# Patient Record
Sex: FEMALE | Race: WHITE | Employment: OTHER | URBAN - METROPOLITAN AREA
[De-identification: names, ages, dates, MRNs, and addresses within clinical notes are randomized per-mention and may not be internally consistent; named-entity substitution may affect disease eponyms.]

---

## 2017-01-05 ENCOUNTER — OFFICE VISIT (OUTPATIENT)
Dept: ORTHOPEDICS | Facility: CLINIC | Age: 52
End: 2017-01-05

## 2017-01-05 DIAGNOSIS — Z96.651 STATUS POST TOTAL RIGHT KNEE REPLACEMENT: Primary | ICD-10-CM

## 2017-01-05 RX ORDER — HYDROCODONE BITARTRATE AND ACETAMINOPHEN 5; 325 MG/1; MG/1
1 TABLET ORAL EVERY 6 HOURS PRN
Qty: 30 TABLET | Refills: 0 | Status: SHIPPED | OUTPATIENT
Start: 2017-01-05 | End: 2017-03-09

## 2017-01-05 NOTE — PROGRESS NOTES
HISTORY OF PRESENT ILLNESS:  Nicole is seen 6 weeks post of her right tibia nonunion ORIF, status post TKA.  She is doing well, is in a hinged brace.  The wound is healed and it is benign with no acute evidence of infection or drainage.  She is nontender, the brace and is not rubbing anymore and she feels that she is progressing well, has it off while she is sitting.  She is working on gentle range of motion with isometrics to her quads, taking occasional Vicodin at night for pain.  No acute neurocirculatory deficits are noted.  X-rays were taken, which show no significant change in healing of the nonunion ORIF, date of surgery 11/17/2016.      PLAN:  At this time, we will start a bone stimulator.  We will call her and get this set up.  We again advised her to stop smoking cigarettes.  We gave her more Vicodin for pain.  She will stay in the brace and follow up in 4-6 weeks for an AP and lateral x-ray of her right knee on arrival or sooner should she have increasing pain, problems or other complications.

## 2017-01-05 NOTE — Clinical Note
1/5/2017       RE: Nicole Rosales  [address] [facility] [address]     Dear Colleague,    Thank you for referring your patient, Nicole Rosales, to the [facility] at [facility]. Please see a copy of my visit note below.    HISTORY OF PRESENT ILLNESS:  Nicole is seen 6 weeks post of her right tibia nonunion ORIF, status post TKA.  She is doing well, is in a hinged brace.  The wound is healed and it is benign with no acute evidence of infection or drainage.  She is nontender, the brace and is not rubbing anymore and she feels that she is progressing well, has it off while she is sitting.  She is working on gentle range of motion with isometrics to her quads, taking occasional Vicodin at night for pain.  No acute neurocirculatory deficits are noted.  X-rays were taken, which show no significant change in healing of the nonunion ORIF, date of surgery 11/17/2016.      PLAN:  At this time, we will start a bone stimulator.  We will call her and get this set up.  We again advised her to stop smoking cigarettes.  We gave her more Vicodin for pain.  She will stay in the brace and follow up in 4-6 weeks for an AP and lateral x-ray of her right knee on arrival or sooner should she have increasing pain, problems or other complications.           Again, thank you for allowing me to participate in the care of your patient.      Sincerely,    Ozzie Angulo MD

## 2017-01-30 DIAGNOSIS — Z96.659 PAINFUL TOTAL KNEE REPLACEMENT, SUBSEQUENT ENCOUNTER: Primary | ICD-10-CM

## 2017-01-30 DIAGNOSIS — T84.84XD PAINFUL TOTAL KNEE REPLACEMENT, SUBSEQUENT ENCOUNTER: Primary | ICD-10-CM

## 2017-01-30 RX ORDER — HYDROCODONE BITARTRATE AND ACETAMINOPHEN 5; 325 MG/1; MG/1
1 TABLET ORAL DAILY PRN
Qty: 10 TABLET | Refills: 0 | Status: SHIPPED | OUTPATIENT
Start: 2017-01-30 | End: 2017-03-09

## 2017-01-30 NOTE — TELEPHONE ENCOUNTER
Nicole was seen 1/5/17 and is s/p Open reduction/internal fixation with repair of right tibial nonunion on 11/17/16.  Nicole is calling for [place] refill, stating she cannot take Tramadol, and her knee has been bothering her, the screw seems to be more prominent.  She has an appt in two weeks, bone stim has been ordered, Nicole states she's down to about 5 cigarettes daily.  Rx renewed for #10 [place], no more will be refilled.  Rx mailed to pt's home per pt request.

## 2017-02-09 DIAGNOSIS — Z96.651 STATUS POST TOTAL RIGHT KNEE REPLACEMENT: Primary | ICD-10-CM

## 2017-02-15 ASSESSMENT — ENCOUNTER SYMPTOMS
PANIC: 0
POLYDIPSIA: 1
DECREASED CONCENTRATION: 0
MUSCLE CRAMPS: 1
WEIGHT LOSS: 1
SNORES LOUDLY: 1
DEPRESSION: 1
SORE THROAT: 1
COUGH: 1
HEMOPTYSIS: 0
ARTHRALGIAS: 1
TROUBLE SWALLOWING: 0
SINUS PAIN: 1
INSOMNIA: 1
TASTE DISTURBANCE: 0
HALLUCINATIONS: 0
DYSPNEA ON EXERTION: 1
DECREASED APPETITE: 1
WHEEZING: 1
SPUTUM PRODUCTION: 1
ALTERED TEMPERATURE REGULATION: 1
NIGHT SWEATS: 1
FATIGUE: 0
INCREASED ENERGY: 0
FEVER: 0
HOARSE VOICE: 1
WEIGHT GAIN: 0
MUSCLE WEAKNESS: 1
COUGH DISTURBING SLEEP: 1
NERVOUS/ANXIOUS: 0
NECK MASS: 0
STIFFNESS: 1
MYALGIAS: 1
CHILLS: 0
NECK PAIN: 0
JOINT SWELLING: 1
POLYPHAGIA: 0
RESPIRATORY PAIN: 1
BACK PAIN: 1
POSTURAL DYSPNEA: 1
SMELL DISTURBANCE: 0
SHORTNESS OF BREATH: 1
SINUS CONGESTION: 1

## 2017-02-16 ENCOUNTER — OFFICE VISIT (OUTPATIENT)
Dept: ORTHOPEDICS | Facility: CLINIC | Age: 52
End: 2017-02-16

## 2017-02-16 DIAGNOSIS — Z96.651 STATUS POST TOTAL RIGHT KNEE REPLACEMENT: Primary | ICD-10-CM

## 2017-02-16 RX ORDER — HYDROCODONE BITARTRATE AND ACETAMINOPHEN 5; 325 MG/1; MG/1
1 TABLET ORAL EVERY 8 HOURS PRN
Qty: 20 TABLET | Refills: 0 | Status: SHIPPED | OUTPATIENT
Start: 2017-02-16 | End: 2017-04-06

## 2017-02-16 NOTE — LETTER
"2/16/2017       RE: Nicole Rosales  [address] [facility] [address]     Dear Colleague,    Thank you for referring your patient, Nicole Rosales, to the [facility]THOP[facility]IC at [facility]. Please see a copy of my visit note below.    HISTORY OF PRESENT ILLNESS:  Nicole is seen on postop of her right tibia nonunion ORIF, status post TKA.  She is doing well with a hinged brace.  She reports it does not fit her appropriately and continues to rub on the anterior part of her skin.  She feels that the screws are being quite irritated because of this.  She has just started to use a bone stimulator.  She does report that she is continuing to smoke.  She states she takes Vicodin \"occasionally at night.\"       PHYSICAL EXAMINATION:  Incisions are seen clean, healed, benign and intact.  There are some prominent screw ends there, but the skin itself is intact with no evidence of further dislodgement or change in skin position or tenting.  No acute neurocirculatory deficits are noted.      IMAGING:  New x-rays were taken which show no significant change in healing of the nonunion ORIF.       DATE OF SURGERY:  11/17/2016      PLAN:  At this time, we told her she can discontinue the brace.  She will continue the bone stimulator  2 times per day.  We gave her one more prescription for Vicodin at night.  This will be her last narcotic prescription that we will give.  She will continue isometrics.  If she has any change in her skin sensation or tenting through the skin, she will call us ASAP for urgent screw removal.  Otherwise, we will see her in 2 months for an AP and lateral x-ray of her right knee on arrival.      Dictated by LIZ Strong     Sincerely,    Ozzie Angulo MD      "

## 2017-02-16 NOTE — PROGRESS NOTES
"HISTORY OF PRESENT ILLNESS:  Nicole is seen on postop of her right tibia nonunion ORIF, status post TKA.  She is doing well with a hinged brace.  She reports it does not fit her appropriately and continues to rub on the anterior part of her skin.  She feels that the screws are being quite irritated because of this.  She has just started to use a bone stimulator.  She does report that she is continuing to smoke.  She states she takes Vicodin \"occasionally at night.\"       PHYSICAL EXAMINATION:  Incisions are seen clean, healed, benign and intact.  There are some prominent screw ends there, but the skin itself is intact with no evidence of further dislodgement or change in skin position or tenting.  No acute neurocirculatory deficits are noted.      IMAGING:  New x-rays were taken which show no significant change in healing of the nonunion ORIF.       DATE OF SURGERY:  11/17/2016      PLAN:  At this time, we told her she can discontinue the brace.  She will continue the bone stimulator  2 times per day.  We gave her one more prescription for Vicodin at night.  This will be her last narcotic prescription that we will give.  She will continue isometrics.  If she has any change in her skin sensation or tenting through the skin, she will call us ASAP for urgent screw removal.  Otherwise, we will see her in 2 months for an AP and lateral x-ray of her right knee on arrival.      Dictated by LIZ Strong       "

## 2017-02-16 NOTE — MR AVS SNAPSHOT
After Visit Summary   2/16/2017    Nicole Rosales    MRN: [id number]           Patient Information     Date Of Birth          1965        Visit Information        Provider Department      2/16/2017 3:00 PM Ozzie Angulo MD [facility]        Today's Diagnoses     Status post total right knee replacement    -  1       Follow-ups after your visit        Your next 10 appointments already scheduled     Apr 20, 2017  1:00 PM CDT   (Arrive by 12:45 PM)   Return Visit with Ozzie Angulo MD   [facility] ([facility])    [address] [place] [id number] [phone]              Who to contact     Please call your clinic at [phone] to:    Ask questions about your health    Make or cancel appointments    Discuss your medicines    Learn about your test results    Speak to your doctor   If you have compliments or concerns about an experience at your clinic, or if you wish to file a complaint, please contact [facility] Patient Relations at [phone] or email us at [email or web address]         Additional Information About Your Visit        MyChart Information     [employer] gives you secure access to your electronic health record. If you see a primary care provider, you can also send messages to your care team and make appointments. If you have questions, please call your primary care clinic.  If you do not have a primary care provider, please call [phone] and they will assist you.      [employer] is an electronic gateway that provides easy, online access to your medical records. With [employer], you can request a clinic appointment, read your test results, renew a prescription or communicate with your care team.     To access your existing account, please contact your [facility] or call [phone] for assistance.        Care EveryWhere ID      This is your Care EveryWhere ID. This could be used by other organizations to access your [place] medical records  [id number]         Blood Pressure from Last 3 Encounters:   No data found for BP    Weight from Last 3 Encounters:   No data found for Wt              Today, you had the following     No orders found for display         Today's Medication Changes          These changes are accurate as of: 2/16/17 11:59 PM.  If you have any questions, ask your nurse or doctor.               These medicines have changed or have updated prescriptions.        Dose/Directions    * HYDROcodone-acetaminophen 5-325 MG per tablet   Commonly known as:  NORCO   This may have changed:  Another medication with the same name was added. Make sure you understand how and when to take each.   Used for:  Status post total right knee replacement   Changed by:  Ozzie Angulo MD        Dose:  1 tablet   Take 1 tablet by mouth every 6 hours as needed for moderate to severe pain   Quantity:  30 tablet   Refills:  0       * HYDROcodone-acetaminophen 5-325 MG per tablet   Commonly known as:  NORCO   This may have changed:  Another medication with the same name was added. Make sure you understand how and when to take each.   Used for:  Painful total knee replacement, subsequent encounter   Changed by:  Aliya Cordoba, APRN CNP        Dose:  1 tablet   Take 1 tablet by mouth daily as needed for moderate to severe pain   Quantity:  10 tablet   Refills:  0       * HYDROcodone-acetaminophen 5-325 MG per tablet   Commonly known as:  NORCO   This may have changed:  You were already taking a medication with the same name, and this prescription was added. Make sure you understand how and when to take each.   Used for:  Status post total right knee replacement   Changed by:  Ozzie Angulo MD        Dose:  1 tablet   Take 1 tablet by mouth every 8 hours as needed for moderate to severe pain   Quantity:  20 tablet   Refills:  0       *  Notice:  This list has 3 medication(s) that are the same as other medications prescribed for you. Read the directions carefully, and ask your doctor or other care provider to review them with you.         Where to get your medicines      Some of these will need a paper prescription and others can be bought over the counter.  Ask your nurse if you have questions.     Bring a paper prescription for each of these medications     HYDROcodone-acetaminophen 5-325 MG per tablet                Primary Care Provider Office Phone # Fax #    Pj Damon [phone]       [place] [address] [facility] [address]        Thank you!     Thank you for choosing [facility]  for your care. Our goal is always to provide you with excellent care. Hearing back from our patients is one way we can continue to improve our services. Please take a few minutes to complete the written survey that you may receive in the mail after your visit with us. Thank you!             Your Updated Medication List - Protect others around you: Learn how to safely use, store and throw away your medicines at www.disposemymeds.org.          This list is accurate as of: 2/16/17 11:59 PM.  Always use your most recent med list.                   Brand Name Dispense Instructions for use    acetaminophen 650 MG 8 hour tablet     100 tablet    Take 650 mg by mouth every 4 hours as needed for other (surgical pain)       ADVAIR DISKUS IN      Inhale 1 puff into the lungs two times daily       albuterol 108 (90 BASE) MCG/ACT Inhaler    PROAIR HFA/PROVENTIL HFA/VENTOLIN HFA     Inhale 2 puffs into the lungs every 6 hours as needed       cephALEXin 500 MG capsule    KEFLEX    14 capsule    Take 1 capsule (500 mg) by mouth 2 times daily       EPINEPHrine 0.3 MG/0.3ML injection      Inject 0.3 mg into the muscle once as needed for anaphylaxis       * HYDROcodone-acetaminophen 5-325 MG per tablet    NORCO    30 tablet     Take 1 tablet by mouth every 6 hours as needed for moderate to severe pain       * HYDROcodone-acetaminophen 5-325 MG per tablet    NORCO    10 tablet    Take 1 tablet by mouth daily as needed for moderate to severe pain       * HYDROcodone-acetaminophen 5-325 MG per tablet    NORCO    20 tablet    Take 1 tablet by mouth every 8 hours as needed for moderate to severe pain       nicotine 14 MG/24HR 24 hr patch    NICODERM CQ    30 patch    Place 1 patch onto the skin every 24 hours       NORVASC PO      Take 5 mg by mouth daily       oxyCODONE 5 MG IR tablet    ROXICODONE    30 tablet    Take 1-2 tablets (5-10 mg) by mouth every 3 hours as needed for pain or other (Moderate to Severe)       potassium chloride 20 MEQ Packet    KLOR-CON     Take 20 mEq by mouth 2 times daily       PROTONIX PO      Take 40 mg by mouth every morning (before breakfast)       REQUIP PO      Take 0.5 mg by mouth daily       tiotropium 18 MCG capsule    SPIRIVA     Inhale 18 mcg into the lungs daily       TRAZODONE HCL PO      Take 50 mg by mouth At Bedtime       * Notice:  This list has 3 medication(s) that are the same as other medications prescribed for you. Read the directions carefully, and ask your doctor or other care provider to review them with you.

## 2017-03-09 ENCOUNTER — OFFICE VISIT (OUTPATIENT)
Dept: ORTHOPEDICS | Facility: CLINIC | Age: 52
End: 2017-03-09

## 2017-03-09 DIAGNOSIS — Z96.659 PAINFUL TOTAL KNEE REPLACEMENT, INITIAL ENCOUNTER (H): Primary | ICD-10-CM

## 2017-03-09 DIAGNOSIS — G89.29 CHRONIC PAIN OF RIGHT KNEE: Primary | ICD-10-CM

## 2017-03-09 DIAGNOSIS — T84.84XA PAINFUL TOTAL KNEE REPLACEMENT, INITIAL ENCOUNTER (H): Primary | ICD-10-CM

## 2017-03-09 DIAGNOSIS — M25.561 CHRONIC PAIN OF RIGHT KNEE: Primary | ICD-10-CM

## 2017-03-09 NOTE — PROGRESS NOTES
HISTORY OF PRESENT ILLNESS:  Nicole is seen now 4 months post of her right tibia nonunion ORIF tibial tubercle, status post TKA, status post infection.  She called this morning rating her pain a 10/10 and is requesting something for pain.  She denies injuries, falls, twists or trauma, has been doing the bone stimulator now for almost 1 month twice a day.  She has been not wearing the brace and her skin actually is markedly improved compared to her last visit.      PAST MEDICAL HISTORY:  Reviewed.      REVIEW OF SYSTEMS:  Reviewed.      PHYSICAL EXAMINATION:  This is a pleasant, cooperative female.  Incisions are seen clean, healed, benign and intact.  Screw heads are prominent upon palpation but have remained unchanged compared to last exam.  Skin is intact with no dislodgement or change in significant position or tenting.  No acute neurocirculatory deficits.  Knee exam is within normal limits compared to previous exam.  Extension mechanism is intact.      New x-rays were taken which show no significant change in healing of the nonunion site ORIF with no change in position of fixation or hardware.  Date of surgery 11/17/2016.      PLAN:  At this time, we did encourage her to continue on the bone stimulator and use ice or heat.  At this time, patient did report that she does not want narcotics; in fact the last prescription that I gave her she still had and did not have this filled.  At this time, we prescribed salsalate/Excedrin so she can take 1-2 tabs b.i.d. for pain.  She will follow up in 1 month for an AP and lateral x-ray of her right knee on arrival.

## 2017-03-09 NOTE — LETTER
3/9/2017       RE: Nicole Rosales  [address] [facility] [address]     Dear Colleague,    Thank you for referring your patient, Nicole Rosales, to the [facility]THOPAED[facility]INIC at [facility]. Please see a copy of my visit note below.    HISTORY OF PRESENT ILLNESS:  Nicole is seen now 4 months post of her right tibia nonunion ORIF tibial tubercle, status post TKA, status post infection.  She called this morning rating her pain a 10/10 and is requesting something for pain.  She denies injuries, falls, twists or trauma, has been doing the bone stimulator now for almost 1 month twice a day.  She has been not wearing the brace and her skin actually is markedly improved compared to her last visit.      PAST MEDICAL HISTORY:  Reviewed.      REVIEW OF SYSTEMS:  Reviewed.      PHYSICAL EXAMINATION:  This is a pleasant, cooperative female.  Incisions are seen clean, healed, benign and intact.  Screw heads are prominent upon palpation but have remained unchanged compared to last exam.  Skin is intact with no dislodgement or change in significant position or tenting.  No acute neurocirculatory deficits.  Knee exam is within normal limits compared to previous exam.  Extension mechanism is intact.      New x-rays were taken which show no significant change in healing of the nonunion site ORIF with no change in position of fixation or hardware.  Date of surgery 11/17/2016.      PLAN:  At this time, we did encourage her to continue on the bone stimulator and use ice or heat.  At this time, patient did report that she does not want narcotics; in fact the last prescription that I gave her she still had and did not have this filled.  At this time, we prescribed salsalate/Excedrin so she can take 1-2 tabs b.i.d. for pain.  She will follow up in 1 month for an AP and lateral x-ray of her right knee on arrival.     Sincerely,    Ozzie Angulo MD

## 2017-03-14 ENCOUNTER — TELEPHONE (OUTPATIENT)
Dept: ORTHOPEDICS | Facility: CLINIC | Age: 52
End: 2017-03-14

## 2017-03-14 DIAGNOSIS — M25.561 CHRONIC PAIN OF RIGHT KNEE: Primary | ICD-10-CM

## 2017-03-14 DIAGNOSIS — G89.29 CHRONIC PAIN OF RIGHT KNEE: Primary | ICD-10-CM

## 2017-03-14 NOTE — TELEPHONE ENCOUNTER
Nicole called to say her pharmacy did not have access to the combo medication for her knee pain.  Aliya Cordoba NP was consulted and she ordered Salsylate Mag, and the order was escribed to pt's pharm.

## 2017-03-23 ASSESSMENT — ENCOUNTER SYMPTOMS
MUSCLE CRAMPS: 1
COUGH DISTURBING SLEEP: 1
SHORTNESS OF BREATH: 1
BACK PAIN: 1
MUSCLE WEAKNESS: 1
DYSPNEA ON EXERTION: 0
STIFFNESS: 1
HEMOPTYSIS: 0
MYALGIAS: 1
WHEEZING: 1
COUGH: 1
ARTHRALGIAS: 1
NECK PAIN: 0
POSTURAL DYSPNEA: 0
SNORES LOUDLY: 1
JOINT SWELLING: 1
RESPIRATORY PAIN: 0
SPUTUM PRODUCTION: 1

## 2017-03-31 DIAGNOSIS — Z96.651 STATUS POST TOTAL RIGHT KNEE REPLACEMENT: Primary | ICD-10-CM

## 2017-04-06 ENCOUNTER — OFFICE VISIT (OUTPATIENT)
Dept: ORTHOPEDICS | Facility: CLINIC | Age: 52
End: 2017-04-06

## 2017-04-06 DIAGNOSIS — Z96.652 STATUS POST REVISION OF TOTAL REPLACEMENT OF LEFT KNEE: Primary | ICD-10-CM

## 2017-04-06 NOTE — MR AVS SNAPSHOT
After Visit Summary   4/6/2017    Nicole Rosales    MRN: [id number]           Patient Information     Date Of Birth          1965        Visit Information        Provider Department      4/6/2017 10:45 AM Aliya Cordoba APRN CNP [facility]        Today's Diagnoses     Status post revision of total replacement of left knee    -  1       Follow-ups after your visit        Additional Services     PHYSICAL THERAPY REFERRAL (External-Prints)       Physical Therapy Referral                  Your next 10 appointments already scheduled     May 25, 2017  2:45 PM CDT   (Arrive by 2:30 PM)   Return Visit with Ozzie Angulo MD   [facility] ([facility] [place])    [address]  [address] [facility] [address] [phone]              Who to contact     Please call your clinic at [phone] to:    Ask questions about your health    Make or cancel appointments    Discuss your medicines    Learn about your test results    Speak to your doctor   If you have compliments or concerns about an experience at your clinic, or if you wish to file a complaint, please contact [facility] Patient Relations at [phone] or email us at [email or web address]         Additional Information About Your Visit        MyChart Information     [employer] gives you secure access to your electronic health record. If you see a primary care provider, you can also send messages to your care team and make appointments. If you have questions, please call your primary care clinic.  If you do not have a primary care provider, please call [phone] and they will assist you.      [employer] is an electronic gateway that provides easy, online access to your medical records. With [employer], you can request a clinic appointment, read your test results, renew a prescription or communicate with your care team.     To access your existing  account, please contact your [facility] or call [phone] for assistance.        Care EveryWhere ID     This is your Care EveryWhere ID. This could be used by other organizations to access your [place] medical records  [id number]         Blood Pressure from Last 3 Encounters:   11/17/16 160/66   01/15/16 118/53    Weight from Last 3 Encounters:   11/17/16 51.4 kg (113 lb 5.1 oz)   01/27/16 54.4 kg (120 lb)   01/14/16 53.9 kg (118 lb 13.3 oz)              We Performed the Following     PHYSICAL THERAPY REFERRAL (External-Prints)        Primary Care Provider Office Phone # Fax #    Pj Damon [phone]       [place] [id number] [facility] [address]        Thank you!     Thank you for choosing [facility]  for your care. Our goal is always to provide you with excellent care. Hearing back from our patients is one way we can continue to improve our services. Please take a few minutes to complete the written survey that you may receive in the mail after your visit with us. Thank you!             Your Updated Medication List - Protect others around you: Learn how to safely use, store and throw away your medicines at www.disposemymeds.org.          This list is accurate as of: 4/6/17 11:59 PM.  Always use your most recent med list.                   Brand Name Dispense Instructions for use    acetaminophen 650 MG 8 hour tablet     100 tablet    Take 650 mg by mouth every 4 hours as needed for other (surgical pain)       ADVAIR DISKUS IN      Inhale 1 puff into the lungs two times daily       albuterol 108 (90 BASE) MCG/ACT Inhaler    PROAIR HFA/PROVENTIL HFA/VENTOLIN HFA     Inhale 2 puffs into the lungs every 6 hours as needed       EPINEPHrine 0.3 MG/0.3ML injection      Inject 0.3 mg into the muscle once as needed for anaphylaxis       Magnesium Salicylate 325 MG Tabs     90 tablet    Take 1-2 tablets by mouth 2 times daily as  needed       nicotine 14 MG/24HR 24 hr patch    NICODERM CQ    30 patch    Place 1 patch onto the skin every 24 hours       NORVASC PO      Take 5 mg by mouth daily       potassium chloride 20 MEQ Packet    KLOR-CON     Take 20 mEq by mouth 2 times daily       PROTONIX PO      Take 40 mg by mouth every morning (before breakfast)       REQUIP PO      Take 0.5 mg by mouth daily       tiotropium 18 MCG capsule    SPIRIVA     Inhale 18 mcg into the lungs daily       TRAZODONE HCL PO      Take 50 mg by mouth At Bedtime

## 2017-04-06 NOTE — LETTER
"4/6/2017       RE: Nicole Rosales  [address] [facility] [address]     Dear Colleague,    Thank you for referring your patient, Nicole Rosales, to the [facility] at [facility]. Please see a copy of my visit note below.    HPI      ROS      Physical Exam      Ortho Exam  Nicole Rosales is seen now, nearly 5 months post her nonunion site ORIF with internal fixation.  Date of her surgery was 11/17/2016.  She reports that the bone stimulator continues to \"spark\" and makes her pain worse; therefore, she stopped for the last 2 weeks.  She reports that her pain is not much different than preoperatively, and she is frustrated with her stiffness.  She is taking nothing for pain, except for ibuprofen on a daily basis.  She denies any new injuries, falls, twists or trauma.  Rates her pain a 6-7.  She reports that her patella \"is just as unstable as it's ever been.\"      PAST MEDICAL HISTORY:  Reviewed.      REVIEW OF SYSTEMS:  Reviewed.      PHYSICAL EXAMINATION:  Pleasant, cooperative female arises from the seated position unguarded.  Patella tracks mildly lateral with minimal crepitus.  She has focal areas where I can feel prominent hardware anteriorly.  Otherwise, no joint effusion is noted.  No swelling.  Skin is intact with no rashes, open areas or ecchymosis.  No pain to palpate the calf today.  She walks with a fairly fluid gait pattern.      IMAGING:  X-rays were taken, which show no significant changes.  Position of the internal fixation devices and screws remain unchanged over serial x-rays.  Total knee arthroplasty is in good position without evidence of shifting or change in position.      DIAGNOSIS:     1.  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   2.  Right tibial nonunion ORIF tibial tubercle.      PLAN:  At this time, the natural progression and plan of care was discussed.  At this time, the patient is requesting physical therapy for some " strengthening to see if that can help with some of the stability and stiffness.  Therefore, I did recommend formal PT, and a prescription was given.  She can progress her strengthening exercises to patellar sparing strengthening, isometrics and gait training with no passive motion.  She will follow up in 6-8 weeks with Dr. Angulo or sooner if she has increasing pain, problems or other complications.       Again, thank you for allowing me to participate in the care of your patient.      Sincerely,    LIZ Colin CNP

## 2017-04-06 NOTE — PROGRESS NOTES
"HPI      ROS      Physical Exam      Ortho Exam  Nicole Rosales is seen now, nearly 5 months post her nonunion site ORIF with internal fixation.  Date of her surgery was 11/17/2016.  She reports that the bone stimulator continues to \"spark\" and makes her pain worse; therefore, she stopped for the last 2 weeks.  She reports that her pain is not much different than preoperatively, and she is frustrated with her stiffness.  She is taking nothing for pain, except for ibuprofen on a daily basis.  She denies any new injuries, falls, twists or trauma.  Rates her pain a 6-7.  She reports that her patella \"is just as unstable as it's ever been.\"      PAST MEDICAL HISTORY:  Reviewed.      REVIEW OF SYSTEMS:  Reviewed.      PHYSICAL EXAMINATION:  Pleasant, cooperative female arises from the seated position unguarded.  Patella tracks mildly lateral with minimal crepitus.  She has focal areas where I can feel prominent hardware anteriorly.  Otherwise, no joint effusion is noted.  No swelling.  Skin is intact with no rashes, open areas or ecchymosis.  No pain to palpate the calf today.  She walks with a fairly fluid gait pattern.      IMAGING:  X-rays were taken, which show no significant changes.  Position of the internal fixation devices and screws remain unchanged over serial x-rays.  Total knee arthroplasty is in good position without evidence of shifting or change in position.      DIAGNOSIS:     1.  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   2.  Right tibial nonunion ORIF tibial tubercle.      PLAN:  At this time, the natural progression and plan of care was discussed.  At this time, the patient is requesting physical therapy for some strengthening to see if that can help with some of the stability and stiffness.  Therefore, I did recommend formal PT, and a prescription was given.  She can progress her strengthening exercises to patellar sparing strengthening, isometrics and gait training with no passive motion.  She " will follow up in 6-8 weeks with Dr. Angulo or sooner if she has increasing pain, problems or other complications.         Answers for HPI/ROS submitted by the patient on 3/23/2017   General Symptoms: No  Skin Symptoms: No  HENT Symptoms: No  EYE SYMPTOMS: No  HEART SYMPTOMS: No  LUNG SYMPTOMS: Yes  INTESTINAL SYMPTOMS: No  URINARY SYMPTOMS: No  GYNECOLOGIC SYMPTOMS: No  BREAST SYMPTOMS: No  SKELETAL SYMPTOMS: Yes  BLOOD SYMPTOMS: No  NERVOUS SYSTEM SYMPTOMS: No  MENTAL HEALTH SYMPTOMS: No  Snoring: Yes  Difficulty breathing on exertion: No  Respiratory pain: No  Nighttime Cough: Yes  Difficulty breathing when lying flat: No  Swollen joints: Yes  Joint pain: Yes  Bone pain: Yes  Muscle cramps: Yes  Muscle weakness: Yes  Joint stiffness: Yes  Bone fracture: No

## 2017-04-13 ENCOUNTER — TELEPHONE (OUTPATIENT)
Dept: ORTHOPEDICS | Facility: CLINIC | Age: 52
End: 2017-04-13

## 2017-04-13 NOTE — TELEPHONE ENCOUNTER
Patient of Dr. Angulo, nonunion site ORIF with internal fixation, surgery in November.  Incision opened above knee, pt states there are a couple bloody drops, denies injury, denies fever, small amount of redness around the area.  Patient put steri strips on and drainage has decreased.  She is seeing PT today from 2-3 and will have them look at it so see what they think, and return to PT next week when therapist will check again.  Nicole was given instructions [facility] if [facility] worsens, or with Aliya here at the  next Thursday if needed.  Nicole agreed to the plan.

## 2017-05-15 DIAGNOSIS — Z96.651 STATUS POST TOTAL RIGHT KNEE REPLACEMENT: Primary | ICD-10-CM

## 2017-05-17 ASSESSMENT — ENCOUNTER SYMPTOMS
POLYPHAGIA: 0
DYSPNEA ON EXERTION: 1
NECK PAIN: 0
EYE IRRITATION: 0
BACK PAIN: 1
DIARRHEA: 0
RECTAL BLEEDING: 0
CHILLS: 0
MYALGIAS: 1
POSTURAL DYSPNEA: 1
EYE REDNESS: 0
NAUSEA: 0
FATIGUE: 0
JAUNDICE: 0
JOINT SWELLING: 1
DECREASED APPETITE: 1
MUSCLE CRAMPS: 1
WEIGHT GAIN: 0
POLYDIPSIA: 1
EYE WATERING: 1
SPUTUM PRODUCTION: 1
BLOATING: 0
MUSCLE WEAKNESS: 0
HALLUCINATIONS: 0
WHEEZING: 1
EYE PAIN: 0
INCREASED ENERGY: 0
ALTERED TEMPERATURE REGULATION: 0
ARTHRALGIAS: 1
DOUBLE VISION: 0
WEIGHT LOSS: 0
COUGH: 1
FEVER: 0
ABDOMINAL PAIN: 0
HEARTBURN: 1
SHORTNESS OF BREATH: 1
COUGH DISTURBING SLEEP: 1
VOMITING: 0
RECTAL PAIN: 0
HEMOPTYSIS: 0
SNORES LOUDLY: 1
STIFFNESS: 1
CONSTIPATION: 0
NIGHT SWEATS: 1
BLOOD IN STOOL: 0
RESPIRATORY PAIN: 0

## 2017-05-25 ENCOUNTER — OFFICE VISIT (OUTPATIENT)
Dept: ORTHOPEDICS | Facility: CLINIC | Age: 52
End: 2017-05-25

## 2017-05-25 DIAGNOSIS — T84.498D: Primary | ICD-10-CM

## 2017-05-25 NOTE — MR AVS SNAPSHOT
After Visit Summary   5/25/2017    Nicole Rosales    MRN: [id number]           Patient Information     Date Of Birth          1965        Visit Information        Provider Department      5/25/2017 2:45 PM Ozzie nAgulo MD [facility]        Today's Diagnoses     Loosening of internal fixation device, subsequent encounter    -  1       Follow-ups after your visit        Who to contact     Please call your clinic at [phone] to:    Ask questions about your health    Make or cancel appointments    Discuss your medicines    Learn about your test results    Speak to your doctor   If you have compliments or concerns about an experience at your clinic, or if you wish to file a complaint, please contact [facility] Patient Relations at [phone] or email us at [email or web address]         Additional Information About Your Visit        MyChart Information     [email or web address] gives you secure access to your electronic health record. If you see a primary care provider, you can also send messages to your care team and make appointments. If you have questions, please call your primary care clinic.  If you do not have a primary care provider, please call [phone] and they will assist you.      [employer] is an electronic gateway that provides easy, online access to your medical records. With [employer], you can request a clinic appointment, read your test results, renew a prescription or communicate with your care team.     To access your existing account, please contact your [facility] or call [phone] for assistance.        Care EveryWhere ID     This is your Care EveryWhere ID. This could be used by other organizations to access your [place] medical records  [id number]         Blood Pressure from Last 3 Encounters:   No data found for BP    Weight from Last 3 Encounters:   No data found for Wt              Today, you  had the following     No orders found for display         Today's Medication Changes          These changes are accurate as of: 5/25/17 11:59 PM.  If you have any questions, ask your nurse or doctor.               Stop taking these medicines if you haven't already. Please contact your care team if you have questions.     acetaminophen 650 MG 8 hour tablet   Stopped by:  Ozzie Angulo MD           potassium chloride 20 MEQ Packet   Commonly known as:  KLOR-CON   Stopped by:  Ozzie Angulo MD                    Primary Care Provider Office Phone # Fax #    Pj Damon [phone]       [address] [address]        Thank you!     Thank you for choosing [facility]  for your care. Our goal is always to provide you with excellent care. Hearing back from our patients is one way we can continue to improve our services. Please take a few minutes to complete the written survey that you may receive in the mail after your visit with us. Thank you!             Your Updated Medication List - Protect others around you: Learn how to safely use, store and throw away your medicines at www.disposemymeds.org.          This list is accurate as of: 5/25/17 11:59 PM.  Always use your most recent med list.                   Brand Name Dispense Instructions for use    ADVAIR DISKUS IN      Inhale 1 puff into the lungs two times daily       albuterol 108 (90 BASE) MCG/ACT Inhaler    PROAIR HFA/PROVENTIL HFA/VENTOLIN HFA     Inhale 2 puffs into the lungs every 6 hours as needed       EPINEPHrine 0.3 MG/0.3ML injection      Inject 0.3 mg into the muscle once as needed for anaphylaxis       Magnesium Salicylate 325 MG Tabs     90 tablet    Take 1-2 tablets by mouth 2 times daily as needed       nicotine 14 MG/24HR 24 hr patch    NICODERM CQ    30 patch    Place 1 patch onto the skin every 24 hours       NORVASC PO      Take 5 mg by mouth daily       PROTONIX  PO      Take 40 mg by mouth every morning (before breakfast)       REQUIP PO      Take 0.5 mg by mouth daily       tiotropium 18 MCG capsule    SPIRIVA     Inhale 18 mcg into the lungs daily       TRAZODONE HCL PO      Take 50 mg by mouth At Bedtime

## 2017-05-25 NOTE — LETTER
"5/25/2017       RE: Nicole Rosales  [address] [facility] [address]     Dear Colleague,    Thank you for referring your patient, Nicole Rosales, to the [facility] ORTHOPAEDIC CLINIC at [facility]. Please see a copy of my visit note below.     Dictation on: 05/25/2017  3:52 PM by: JOSE ALBERTO AGUERO [[email or web address]]       HISTORY OF PRESENT ILLNESS:  Nicole is seen 6 months post her nonunion site  ORIF with internal fixation.  She reports that she has been doing well.  Date of surgery was 11/17/2016.  She reports no new pain pattern.  In fact, she  reports that she is actually doing well; she is back to \"her baseline.\"  Rates her pain a 5-6, takes nothing for pain on a regular basis.      PAST MEDICAL HISTORY:  Reviewed.      REVIEW OF SYSTEMS:  Reviewed.      PHYSICAL EXAMINATION:  This is a pleasant, cooperative female, alert x3.  Patella tracks with minimal crepitus.  Focal areas of tenderness.  She reports that the distal screw at times \"bleeds\".  Today it is healed, benign, without any open areas, rashes or drainage.  Ligaments are stable on exam with no acute neurocirculatory deficits.      IMAGING:  X-rays were taken, which show no significant changes; position of the internal fixation device and screws remain unchanged; however, they are quite prominent.  Total knee arthroplasty is in good position.      DIAGNOSIS:     1.  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   2.  Right tibial nonunion ORIF with loose fixation and screws.      PLAN:  The natural progression was discussed.  At this time, we will go ahead and remove the distal screw under just local anesthetic [facility] at her soonest convenience.      Again, thank you for allowing me to participate in the care of your patient.      Sincerely,    Ozzie Angulo MD      "

## 2017-05-25 NOTE — PROGRESS NOTES
"HISTORY OF PRESENT ILLNESS:  Nicole is seen 6 months post her nonunion site  ORIF with internal fixation.  She reports that she has been doing well.  Date of surgery was 11/17/2016.  She reports no new pain pattern.  In fact, she  reports that she is actually doing well; she is back to \"her baseline.\"  Rates her pain a 5-6, takes nothing for pain on a regular basis.      PAST MEDICAL HISTORY:  Reviewed.      REVIEW OF SYSTEMS:  Reviewed.      PHYSICAL EXAMINATION:  This is a pleasant, cooperative female, alert x3.  Patella tracks with minimal crepitus.  Focal areas of tenderness.  She reports that the distal screw at times \"bleeds\".  Today it is healed, benign, without any open areas, rashes or drainage.  Ligaments are stable on exam with no acute neurocirculatory deficits.      IMAGING:  X-rays were taken, which show no significant changes; position of the internal fixation device and screws remain unchanged; however, they are quite prominent.  Total knee arthroplasty is in good position.      DIAGNOSIS:     1.  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   2.  Right tibial nonunion ORIF with loose fixation and screws.      PLAN:  The natural progression was discussed.  At this time, we will go ahead and remove the distal screw under just local anesthetic [facility] at her soonest convenience.       Answers for HPI/ROS submitted by the patient on 5/17/2017   General Symptoms: Yes  Skin Symptoms: Yes  HENT Symptoms: No  EYE SYMPTOMS: Yes  HEART SYMPTOMS: No  LUNG SYMPTOMS: Yes  INTESTINAL SYMPTOMS: Yes  URINARY SYMPTOMS: No  GYNECOLOGIC SYMPTOMS: No  BREAST SYMPTOMS: No  SKELETAL SYMPTOMS: Yes  BLOOD SYMPTOMS: No  NERVOUS SYSTEM SYMPTOMS: No  MENTAL HEALTH SYMPTOMS: No  Fever: No  Loss of appetite: Yes  Weight loss: No  Weight gain: No  Fatigue: No  Night sweats: Yes  Chills: No  Increased stress: Yes  Excessive hunger: No  Excessive thirst: Yes  Feeling hot or cold when others believe the temperature " is normal: No  Loss of height: No  Post-operative complications: No  Surgical site pain: Yes  Hallucinations: No  Change in or Loss of Energy: No  Hyperactivity: No  Confusion: No  Eye pain: No  Vision loss: No  Dry eyes: Yes  Watery eyes: Yes  Eye bulging: No  Double vision: No  Flashing of lights: No  Spots: No  Floaters: No  Redness: No  Crossed eyes: No  Tunnel Vision: No  Yellowing of eyes: No  Eye irritation: No  Cough: Yes  Sputum or phlegm: Yes  Coughing up blood: No  Difficulty breating or shortness of breath: Yes  Snoring: Yes  Wheezing: Yes  Difficulty breathing on exertion: Yes  Respiratory pain: No  Nighttime Cough: Yes  Difficulty breathing when lying flat: Yes  Heart burn or indigestion: Yes  Nausea: No  Vomiting: No  Abdominal pain: No  Bloating: No  Constipation: No  Diarrhea: No  Blood in stool: No  Black stools: No  Rectal or Anal pain: No  Rectal bleeding: No  Yellowing of skin or eyes: No  Vomit with blood: No  Change in stools: No  Hemorrhoids: No  Back pain: Yes  Muscle aches: Yes  Neck pain: No  Swollen joints: Yes  Joint pain: Yes  Bone pain: Yes  Muscle cramps: Yes  Muscle weakness: No  Joint stiffness: Yes  Bone fracture: Yes

## 2017-05-26 NOTE — NURSING NOTE
Teaching Flowsheet   Relevant Diagnosis: retained ortho hardware  Teaching Topic: preop right tibial screw removal, local anesthetic only     Person(s) involved in teaching:   Patient     Motivation Level:  Asks Questions: Yes  Eager to Learn: Yes  Cooperative: Yes  Receptive (willing/able to accept information): Yes  Any cultural factors/Adventism beliefs that may influence understanding or compliance? No  Comments:      Patient demonstrates understanding of the following:  Reason for the appointment, diagnosis and treatment plan: Yes  Knowledge of proper use of medications and conditions for which they are ordered (with special attention to potential side effects or drug interactions): Yes  Which situations necessitate calling provider and whom to contact: Yes       Teaching Concerns Addressed:   Comments:      Proper use and care of bandages (medical equip, care aids, etc.): Yes  Nutritional needs and diet plan: NA  Pain management techniques: Yes  Wound Care: Yes  How and/when to access community resources: NA     Instructional Materials Used/Given: preop pkt, antiseptic soap     Time spent with patient: 15 minutes.

## 2017-06-22 ENCOUNTER — SURGERY (OUTPATIENT)
Age: 52
End: 2017-06-22

## 2017-06-22 ENCOUNTER — HOSPITAL ENCOUNTER (OUTPATIENT)
Facility: AMBULATORY SURGERY CENTER | Age: 52
End: 2017-06-22
Attending: ORTHOPAEDIC SURGERY

## 2017-06-22 VITALS
RESPIRATION RATE: 16 BRPM | SYSTOLIC BLOOD PRESSURE: 159 MMHG | BODY MASS INDEX: 18.33 KG/M2 | HEIGHT: 65 IN | DIASTOLIC BLOOD PRESSURE: 69 MMHG | HEART RATE: 90 BPM | OXYGEN SATURATION: 97 % | WEIGHT: 110 LBS | TEMPERATURE: 98.8 F

## 2017-06-22 DIAGNOSIS — M17.11 OSTEOARTHRITIS OF RIGHT KNEE, UNSPECIFIED OSTEOARTHRITIS TYPE: Primary | ICD-10-CM

## 2017-06-22 RX ORDER — BUPIVACAINE HYDROCHLORIDE AND EPINEPHRINE 5; 5 MG/ML; UG/ML
INJECTION, SOLUTION PERINEURAL PRN
Status: DISCONTINUED | OUTPATIENT
Start: 2017-06-22 | End: 2017-06-22 | Stop reason: HOSPADM

## 2017-06-22 RX ORDER — SODIUM CHLORIDE, SODIUM LACTATE, POTASSIUM CHLORIDE, CALCIUM CHLORIDE 600; 310; 30; 20 MG/100ML; MG/100ML; MG/100ML; MG/100ML
INJECTION, SOLUTION INTRAVENOUS CONTINUOUS
Status: DISCONTINUED | OUTPATIENT
Start: 2017-06-22 | End: 2017-06-23 | Stop reason: HOSPADM

## 2017-06-22 RX ORDER — CEFAZOLIN SODIUM 1 G/3ML
1 INJECTION, POWDER, FOR SOLUTION INTRAMUSCULAR; INTRAVENOUS SEE ADMIN INSTRUCTIONS
Status: DISCONTINUED | OUTPATIENT
Start: 2017-06-22 | End: 2017-06-23 | Stop reason: HOSPADM

## 2017-06-22 RX ADMIN — BUPIVACAINE HYDROCHLORIDE AND EPINEPHRINE 1 ML: 5; 5 INJECTION, SOLUTION PERINEURAL at 10:47

## 2017-06-22 NOTE — BRIEF OP NOTE
removal of three screws and three washers right tibia  Pre and post op diagnosis: loose screws  Surgeon Kev  No complications  Anesthesia local

## 2017-06-22 NOTE — IP AVS SNAPSHOT
[facility]    [address] [place] [address]    Phone:  [phone]    Fax:  [phone]                                       After Visit Summary   6/22/2017    Nicole Rosales    MRN: [id number]           After Visit Summary Signature Page     I have received my discharge instructions, and my questions have been answered. I have discussed any challenges I see with this plan with the nurse or doctor.    ..........................................................................................................................................  Patient/Patient Representative Signature      ..........................................................................................................................................  Patient Representative Print Name and Relationship to Patient    ..................................................               ................................................  Date                                            Time    ..........................................................................................................................................  Reviewed by Signature/Title    ...................................................              ..............................................  Date                                                            Time

## 2017-06-22 NOTE — IP AVS SNAPSHOT
MRN:[id number]                      After Visit Summary   6/22/2017    Nicole Rosales    MRN: [id number]           Thank you!     Thank you for choosing [facility] for your care. Our goal is always to provide you with excellent care. Hearing back from our patients is one way we can continue to improve our services. Please take a few minutes to complete the written survey that you may receive in the mail after you visit with us. Thank you!        Patient Information     Date Of Birth          1965        About your hospital stay     You were admitted on:  June 22, 2017 You last received care in the:  [facility]    You were discharged on:  June 22, 2017       Who to Call     For medical emergencies, please call 911.  For non-urgent questions about your medical care, please call your primary care provider or clinic, [phone]  For questions related to your surgery, please call your surgery clinic        Attending Provider     Provider Ozzie Grande MD Orthopedics       Primary Care Provider Office Phone # Fax #    Irzs C Nelson [phone]      Your next 10 appointments already scheduled     Jun 29, 2017  8:00 AM CDT   (Arrive by 7:45 AM)   Post-Op with LIZ Hernández CNP   [facility] ([facility])    [address] [place] [address] [phone]              Further instructions from your care team       [facility]  Home Care Following Your Procedure  Call a doctor if you have signs of infection (fever, growing tenderness at the surgery site, a large amount of drainage or bleeding, severe pain, foul-smelling drainage, redness, swelling).  Your doctor is:  Dr. Ozzie Angulo                                    Or dial [phone] and ask for the resident on call for:  Orthopaedics  For emergency care, call the:  [facility] [place]:  "[phone] (TTY for hearing impaired: [phone])                Pending Results     No orders found from 6/20/2017 to 6/23/2017.            Admission Information     Date & Time Provider Department Dept. Phone    6/22/2017 Ozzie Angulo MD [facility] [phone]      Your Vitals Were     Blood Pressure Pulse Temperature Respirations Height Weight    159/69 90 98.8  F (37.1  C) (Temporal) 16 1.651 m (5' 5\") 49.9 kg (110 lb)    Pulse Oximetry BMI (Body Mass Index)                97% 18.3 kg/m2          [employer] Information     [employer] gives you secure access to your electronic health record. If you see a primary care provider, you can also send messages to your care team and make appointments. If you have questions, please call your primary care clinic.  If you do not have a primary care provider, please call [phone] and they will assist you.      [employer] is an electronic gateway that provides easy, online access to your medical records. With [employer], you can request a clinic appointment, read your test results, renew a prescription or communicate with your care team.     To access your existing account, please contact your [facility] or call [phone] for assistance.        Care EveryWhere ID     This is your Care EveryWhere ID. This could be used by other organizations to access your [place] medical records  [id number]        Equal Access to Services     MILLY ALCARAZ : Hadii albina leroyo Soleeanna, waaxda luqadaha, qaybta kaalmada adebettinayada, macy mejia. So [facility] [phone].    ATENCIÓN: Si habla español, tiene a nicolas disposición servicios gratuitos de asistencia lingüística. Llame al [phone].    We comply with applicable federal civil rights laws and [place] laws. We do not discriminate on the basis of race, color, national origin, age, disability sex, sexual orientation or gender identity.          "      Review of your medicines      UNREVIEWED medicines. Ask your doctor about these medicines        Dose / Directions    ADVAIR DISKUS IN        Dose:  1 puff   Inhale 1 puff into the lungs two times daily   Refills:  0       albuterol 108 (90 BASE) MCG/ACT Inhaler   Commonly known as:  PROAIR HFA/PROVENTIL HFA/VENTOLIN HFA        Dose:  2 puff   Inhale 2 puffs into the lungs every 6 hours as needed   Refills:  0       EPINEPHrine 0.3 MG/0.3ML injection        Dose:  0.3 mg   Inject 0.3 mg into the muscle once as needed for anaphylaxis   Refills:  0       NORVASC PO        Dose:  5 mg   Take 5 mg by mouth daily   Refills:  0       PROTONIX PO        Dose:  40 mg   Take 40 mg by mouth every morning (before breakfast)   Refills:  0       REQUIP PO        Dose:  0.5 mg   Take 0.5 mg by mouth daily   Refills:  0       tiotropium 18 MCG capsule   Commonly known as:  SPIRIVA        Dose:  18 mcg   Inhale 18 mcg into the lungs daily   Refills:  0       TRAZODONE HCL PO        Dose:  50 mg   Take 50 mg by mouth At Bedtime   Refills:  0                Protect others around you: Learn how to safely use, store and throw away your medicines at www.disposemymeds.org.             Medication List: This is a list of all your medications and when to take them. Check marks below indicate your daily home schedule. Keep this list as a reference.      Medications           Morning Afternoon Evening Bedtime As Needed    ADVAIR DISKUS IN   Inhale 1 puff into the lungs two times daily                                albuterol 108 (90 BASE) MCG/ACT Inhaler   Commonly known as:  PROAIR HFA/PROVENTIL HFA/VENTOLIN HFA   Inhale 2 puffs into the lungs every 6 hours as needed                                EPINEPHrine 0.3 MG/0.3ML injection   Inject 0.3 mg into the muscle once as needed for anaphylaxis                                NORVASC PO   Take 5 mg by mouth daily                                PROTONIX PO   Take 40 mg by mouth every  morning (before breakfast)                                REQUIP PO   Take 0.5 mg by mouth daily                                tiotropium 18 MCG capsule   Commonly known as:  SPIRIVA   Inhale 18 mcg into the lungs daily                                TRAZODONE HCL PO   Take 50 mg by mouth At Bedtime

## 2017-06-22 NOTE — DISCHARGE INSTRUCTIONS
[facility]  Home Care Following Your Procedure  Call a doctor if you have signs of infection (fever, growing tenderness at the surgery site, a large amount of drainage or bleeding, severe pain, foul-smelling drainage, redness, swelling).  Your doctor is:  Dr. Ozzie Angulo                                    Or dial [phone] and ask for the resident on call for:  Orthopaedics  For emergency care, call the:  [facility] [place] [place]: [phone] (TTY for hearing impaired: [phone])

## 2017-06-23 ENCOUNTER — TELEPHONE (OUTPATIENT)
Dept: ORTHOPEDICS | Facility: CLINIC | Age: 52
End: 2017-06-23

## 2017-06-23 NOTE — TELEPHONE ENCOUNTER
On 6.22.17 patient had Removal of Right Tibia Screw with Dr Angulo. Pt c/o 9/10 pain, burning and muscle spasms with swelling in leg from toes to above knee including calf, notes skin is slightly warmer to touch compared to other leg. Pt has been icing, elevating and taking NSAIDS without relief. Reports Hx of bone infection with muscle grafting taken from calf which scar is normally indented and is not indented at this point with the swelling. Patient reports Hx of DVT, not currenlty taking anything prophylacticly for blood clot prevention. Advised patient go to ED for evaluation, patient verbalized understanding, stated PCP office has the capability of ultrasound to r/o DVT and assess for possible infection. Advised patient call PCP office immediately to be worked in ASAP if possible, if not to go to ED, patient verbalized understanding and agrees with plan.

## 2017-06-26 DIAGNOSIS — G89.18 POSTOPERATIVE PAIN: Primary | ICD-10-CM

## 2017-06-26 RX ORDER — HYDROCODONE BITARTRATE AND ACETAMINOPHEN 5; 325 MG/1; MG/1
1 TABLET ORAL 2 TIMES DAILY PRN
Qty: 20 TABLET | Refills: 0 | Status: SHIPPED | OUTPATIENT
Start: 2017-06-26 | End: 2017-06-26

## 2017-06-26 RX ORDER — HYDROCODONE BITARTRATE AND ACETAMINOPHEN 5; 325 MG/1; MG/1
1 TABLET ORAL 2 TIMES DAILY PRN
Qty: 20 TABLET | Refills: 0 | Status: SHIPPED | OUTPATIENT
Start: 2017-06-26 | End: 2017-08-17

## 2017-06-26 NOTE — TELEPHONE ENCOUNTER
"Nicole underwent three hardware screw removal last week on 6/22/17, was having increased pain, saw her primary MD on 6/23/17 who gave her \"a couple vicoden\" until she calls her ortho surgeon.  She phoned today, stated she is resting, elevating, icing, has little drainage, no redness, taking one vicoden daily which helps.  Update given to Aliya who ordered Norco 1-2 daily #20.  Rx was mailed to pt's home per pt request.  "

## 2017-06-27 ENCOUNTER — TELEPHONE (OUTPATIENT)
Dept: ORTHOPEDICS | Facility: CLINIC | Age: 52
End: 2017-06-27

## 2017-06-27 NOTE — TELEPHONE ENCOUNTER
Nicole underwent screw removal right tibia on 6/22/17.  Pt calling today to report that the incisions are well healed, but the area on her skin lower than the incisions has become reddened  With a burning sensation.  She states she had this feeling and skin condition before her bone infection and wanted it checked out.  She has been scheduled with Aliya this week.

## 2017-06-28 ASSESSMENT — ENCOUNTER SYMPTOMS
MUSCLE WEAKNESS: 1
COUGH DISTURBING SLEEP: 1
HEMOPTYSIS: 0
BACK PAIN: 1
SPUTUM PRODUCTION: 1
SHORTNESS OF BREATH: 1
SPUTUM PRODUCTION: 1
HEMOPTYSIS: 0
POSTURAL DYSPNEA: 0
NECK PAIN: 0
MYALGIAS: 1
ARTHRALGIAS: 1
WHEEZING: 1
SNORES LOUDLY: 1
DYSPNEA ON EXERTION: 0
JOINT SWELLING: 1
ARTHRALGIAS: 1
COUGH: 1
MUSCLE WEAKNESS: 1
MYALGIAS: 1
MUSCLE CRAMPS: 1
JOINT SWELLING: 1
NECK PAIN: 0
WHEEZING: 1
RESPIRATORY PAIN: 0
MUSCLE CRAMPS: 1
SNORES LOUDLY: 1
STIFFNESS: 1
POSTURAL DYSPNEA: 0
STIFFNESS: 1
COUGH DISTURBING SLEEP: 1
DYSPNEA ON EXERTION: 0
SHORTNESS OF BREATH: 1
RESPIRATORY PAIN: 0
BACK PAIN: 1
COUGH: 1

## 2017-06-29 ENCOUNTER — OFFICE VISIT (OUTPATIENT)
Dept: ORTHOPEDICS | Facility: CLINIC | Age: 52
End: 2017-06-29

## 2017-06-29 DIAGNOSIS — Z98.890 S/P HARDWARE REMOVAL: Primary | ICD-10-CM

## 2017-06-29 NOTE — LETTER
"6/29/2017       RE: Nicole Rosales  [address] [address]     Dear Colleague,    Thank you for referring your patient, Nicole Rosales, to the [facility] Please see a copy of my visit note below.    Patient here for follow up of \"redness\" s/p local screw removal  On 6/22/17. She reports minimal pain. No drainage. Afebrile. Nontender to palpate surrounding tissue and sutures are intact. Lower extremity has some slight inceased erythema but no swelling. Nontender to palpate calf. Negative homans. No neuro changes.   Will follow up as previously scheduled.  2v right knee xray on arrival.     Again, thank you for allowing me to participate in the care of your patient.      Sincerely,    Aliya Cordoba, LIZ CNP      "

## 2017-06-29 NOTE — MR AVS SNAPSHOT
After Visit Summary   6/29/2017    Nicole Rosales    MRN: [id number]           Patient Information     Date Of Birth          1965        Visit Information        Provider Department      6/29/2017 10:45 AM Aliya Cordoba APRN CNP M [facility] Orthopa[facility]        Today's Diagnoses     S/P hardware removal    -  1       Follow-ups after your visit        Your next 10 appointments already scheduled     Jul 06, 2017 10:45 AM CDT   (Arrive by 10:30 AM)   Post-Op with LIZ Hernández CNP [facility] ([facility])    [address] [place] [address] [phone]              Who to contact     Please call your clinic at [phone] to:    Ask questions about your health    Make or cancel appointments    Discuss your medicines    Learn about your test results    Speak to your doctor   If you have compliments or concerns about an experience at your clinic, or if you wish to file a complaint, please contact [facility] Patient Relations at [phone] or email us at [email or web address]         Additional Information About Your Visit        MyChart Information     [employer] gives you secure access to your electronic health record. If you see a primary care provider, you can also send messages to your care team and make appointments. If you have questions, please call your primary care clinic.  If you do not have a primary care provider, please call [phone] and they will assist you.      [employer] is an electronic gateway that provides easy, online access to your medical records. With [employer], you can request a clinic appointment, read your test results, renew a prescription or communicate with your care team.     To access your existing account, please contact your [facility] or call [phone] for assistance.        Care EveryWhere ID     This is your Care  EveryWhere ID. This could be used by other organizations to access your [place] medical records  [id number]         Blood Pressure from Last 3 Encounters:   06/22/17 159/69   11/17/16 160/66   01/15/16 118/53    Weight from Last 3 Encounters:   06/22/17 49.9 kg (110 lb)   11/17/16 51.4 kg (113 lb 5.1 oz)   01/27/16 54.4 kg (120 lb)              Today, you had the following     No orders found for display       Primary Care Provider Office Phone # Fax #    Gene C Nelson [phone]       [place] [address] [facility] [address]        Equal Access to Services     MILLY ALCARAZ : Velma Ruby, wasoledadda chanaadaha, qaybta kaalmada adebettinayamarleni, macy mejia. So [facility] [phone].    ATENCIÓN: Si habla español, tiene a nicolas disposición servicios gratuitos de asistencia lingüística. Llame al [phone].    We comply with applicable federal civil rights laws and [place] laws. We do not discriminate on the basis of race, color, national origin, age, disability sex, sexual orientation or gender identity.            Thank you!     Thank you for choosing [facility]  for your care. Our goal is always to provide you with excellent care. Hearing back from our patients is one way we can continue to improve our services. Please take a few minutes to complete the written survey that you may receive in the mail after your visit with us. Thank you!             Your Updated Medication List - Protect others around you: Learn how to safely use, store and throw away your medicines at www.disposemymeds.org.          This list is accurate as of: 6/29/17 11:59 PM.  Always use your most recent med list.                   Brand Name Dispense Instructions for use Diagnosis    ADVAIR DISKUS IN      Inhale 1 puff into the lungs two times daily        albuterol 108 (90 BASE) MCG/ACT Inhaler    PROAIR HFA/PROVENTIL HFA/VENTOLIN HFA     Inhale 2 puffs  into the lungs every 6 hours as needed        EPINEPHrine 0.3 MG/0.3ML injection      Inject 0.3 mg into the muscle once as needed for anaphylaxis        HYDROcodone-acetaminophen 5-325 MG per tablet    NORCO    20 tablet    Take 1 tablet by mouth 2 times daily as needed for moderate to severe pain    Postoperative pain       NORVASC PO      Take 5 mg by mouth daily        PROTONIX PO      Take 40 mg by mouth every morning (before breakfast)        REQUIP PO      Take 0.5 mg by mouth daily        tiotropium 18 MCG capsule    SPIRIVA     Inhale 18 mcg into the lungs daily        TRAZODONE HCL PO      Take 50 mg by mouth At Bedtime

## 2017-06-29 NOTE — PROGRESS NOTES
"Patient here for follow up of \"redness\" s/p local screw removal  On 6/22/17. She reports minimal pain. No drainage. Afebrile. Nontender to palpate surrounding tissue and sutures are intact. Lower extremity has some slight inceased erythema but no swelling. Nontender to palpate calf. Negative homans. No neuro changes.   Will follow up as previously scheduled.  2v right knee xray on arrival.   "

## 2017-07-06 ENCOUNTER — OFFICE VISIT (OUTPATIENT)
Dept: ORTHOPEDICS | Facility: CLINIC | Age: 52
End: 2017-07-06

## 2017-07-06 DIAGNOSIS — M75.42 IMPINGEMENT SYNDROME OF LEFT SHOULDER: Primary | ICD-10-CM

## 2017-07-06 DIAGNOSIS — T85.848D PAIN FROM IMPLANTED HARDWARE, SUBSEQUENT ENCOUNTER: ICD-10-CM

## 2017-07-06 NOTE — OP NOTE
Pre-and postoperative diagnosis symptomatic pretibial screws.    Procedure: Removal of 3 screws and washers right right tibia.    Surgeon Dall    Procedure: Under local anesthetic after possibly cause moderate anterior knee was prepped and draped in the usual sterile fashion. 3 small incisions were made over the palpable screw screw heads. The screws were each individually removed as were the washers. Wounds were irrigated Marcaine with epinephrine was further infiltrated, a suture applied at each wound, and a compression bandage applied. There were no operative complications noted.

## 2017-07-06 NOTE — LETTER
7/6/2017       RE: Nicole Rosales  [address] [address]     Dear Colleague,    Thank you for referring your patient, Nicole Rosales, to the [facility] at [facility] Please see a copy of my visit note below.    Nicole is now 2 weeks postop after screw removal of her right knee. She reports no new concerns and is feeling fantastic about her knee. Unfortunately she's had increasing left shoulder pain and has been treated in the past for impingement syndrome. She is requesting a cortisone injection into her left shoulder which is been significantly helpful for her in the past. Her sutures were removed from her knee today without difficulty there completely healed and benign no drainage no redness no erythema and nontender to palpate. She will follow up in 4-6 weeks for an AP lateral x-ray of her right knee on arrival sooner she has increasing pain or problems.    Physical exam about her left shoulder reveals symmetrical range of motion with a positive Hawthorne and positive impingement sign. She is able to forward flex without pain she has 4 minus over 5 in forward flexion giving way secondary only to pain. She is symmetrical with strength in external and internal rotation. She can internally rotate to L1 symmetrically.    Preprocedure diagnosis is left shoulder impingement syndrome    Post procedures the same    Procedure under sterile technique the posterior lateral aspect of her shoulder was prepped under sterile technique. 1 cc of 40 mg of Kenalog 2 cc of 1% lidocaine were injected without difficulty.    Again, thank you for allowing me to participate in the care of your patient.      Sincerely,    LIZ Colin CNP

## 2017-07-06 NOTE — MR AVS SNAPSHOT
After Visit Summary   7/6/2017    Nicole Rosales    MRN: [id number]           Patient Information     Date Of Birth          1965        Visit Information        Provider Department      7/6/2017 10:45 AM Aliya Cordoba APRN CNP [facility]        Today's Diagnoses     Impingement syndrome of left shoulder    -  1    Pain from implanted hardware, subsequent encounter           Follow-ups after your visit        Your next 10 appointments already scheduled     Aug 03, 2017  2:45 PM CDT   (Arrive by 2:30 PM)   Return Visit with Ozzie Angulo MD   [facility] ([facility])    [address] [place] [address] [phone]              Who to contact     Please call your clinic at [phone] to:    Ask questions about your health    Make or cancel appointments    Discuss your medicines    Learn about your test results    Speak to your doctor   If you have compliments or concerns about an experience at your clinic, or if you wish to file a complaint, please contact [facility] Patient Relations at [phone] or email us at [email or web address]         Additional Information About Your Visit        MyChart Information     [employer] gives you secure access to your electronic health record. If you see a primary care provider, you can also send messages to your care team and make appointments. If you have questions, please call your primary care clinic.  If you do not have a primary care provider, please call [phone] and they will assist you.      [employer] is an electronic gateway that provides easy, online access to your medical records. With "[employer] you can request a clinic appointment, read your test results, renew a prescription or communicate with your care team.     To access your existing account, please contact your [facility] or call  [phone] for assistance.        Care EveryWhere ID     This is your Care EveryWhere ID. This could be used by other organizations to access your [place] medical records  [id number]         Blood Pressure from Last 3 Encounters:   06/22/17 159/69   11/17/16 160/66   01/15/16 118/53    Weight from Last 3 Encounters:   06/22/17 49.9 kg (110 lb)   11/17/16 51.4 kg (113 lb 5.1 oz)   01/27/16 54.4 kg (120 lb)              We Performed the Following     Large Joint/Bursa injection and/or drainage - Unilateral (Shoulder, Knee) [20610]        Primary Care Provider Office Phone # Fax #    Pj Damon [phone]       [place] [id number] [facility] [address]        Equal Access to Services     MILLY ALCARAZ : Hadii albina turner hadasho Soomaali, waaxda luqadaha, qaybta kaalmada adebettinayada, macy amezcua . So [facility] [phone].    ATENCIÓN: Si habla español, tiene a nicolas disposición servicios gratuitos de asistencia lingüística. Llame al [phone].    We comply with applicable federal civil rights laws and [place] laws. We do not discriminate on the basis of race, color, national origin, age, disability sex, sexual orientation or gender identity.            Thank you!     Thank you for choosing [facility]  for your care. Our goal is always to provide you with excellent care. Hearing back from our patients is one way we can continue to improve our services. Please take a few minutes to complete the written survey that you may receive in the mail after your visit with us. Thank you!             Your Updated Medication List - Protect others around you: Learn how to safely use, store and throw away your medicines at www.disposemymeds.org.          This list is accurate as of: 7/6/17 11:59 PM.  Always use your most recent med list.                   Brand Name Dispense Instructions for use Diagnosis    ADVAIR DISKUS IN      Inhale 1 puff into the  lungs two times daily        albuterol 108 (90 BASE) MCG/ACT Inhaler    PROAIR HFA/PROVENTIL HFA/VENTOLIN HFA     Inhale 2 puffs into the lungs every 6 hours as needed        EPINEPHrine 0.3 MG/0.3ML injection      Inject 0.3 mg into the muscle once as needed for anaphylaxis        HYDROcodone-acetaminophen 5-325 MG per tablet    NORCO    20 tablet    Take 1 tablet by mouth 2 times daily as needed for moderate to severe pain    Postoperative pain       NORVASC PO      Take 5 mg by mouth daily        PROTONIX PO      Take 40 mg by mouth every morning (before breakfast)        REQUIP PO      Take 0.5 mg by mouth daily        tiotropium 18 MCG capsule    SPIRIVA     Inhale 18 mcg into the lungs daily        TRAZODONE HCL PO      Take 50 mg by mouth At Bedtime

## 2017-07-06 NOTE — PROGRESS NOTES
Nicole is now 2 weeks postop after screw removal of her right knee. She reports no new concerns and is feeling fantastic about her knee. Unfortunately she's had increasing left shoulder pain and has been treated in the past for impingement syndrome. She is requesting a cortisone injection into her left shoulder which is been significantly helpful for her in the past. Her sutures were removed from her knee today without difficulty there completely healed and benign no drainage no redness no erythema and nontender to palpate. She will follow up in 4-6 weeks for an AP lateral x-ray of her right knee on arrival sooner she has increasing pain or problems.    Physical exam about her left shoulder reveals symmetrical range of motion with a positive Hawthorne and positive impingement sign. She is able to forward flex without pain she has 4 minus over 5 in forward flexion giving way secondary only to pain. She is symmetrical with strength in external and internal rotation. She can internally rotate to L1 symmetrically.    Preprocedure diagnosis is left shoulder impingement syndrome    Post procedures the same    Procedure under sterile technique the posterior lateral aspect of her shoulder was prepped under sterile technique. 1 cc of 40 mg of Kenalog 2 cc of 1% lidocaine were injected without difficulty.

## 2017-07-06 NOTE — NURSING NOTE
[address] [address]  Dept: [phone]  ______________________________________________________________________________    Patient: Nicole Rosales   : 1965   MRN: [id number]   2017    INVASIVE PROCEDURE SAFETY CHECKLIST    Date: 2017   Procedure:Left shoulder injection  Patient Name: Nicole Rosales  MRN: [id number]  YOB: 1965    Action: Complete sections as appropriate. Any discrepancy results in a HARD COPY until resolved.     PRE PROCEDURE:  Patient ID verified with 2 identifiers (name and  or MRN): Yes  Procedure and site verified with patient/designee (when able): Yes  Accurate consent documentation in medical record: Yes  H&P (or appropriate assessment) documented in medical record: NA  H&P must be up to 20 days prior to procedure and updates within 24 hours of procedure as applicable: NA  Relevant diagnostic and radiology test results appropriately labeled and displayed as applicable: Yes  Procedure site(s) marked with provider initials: Yes    TIMEOUT:  Time-Out performed immediately prior to starting procedure, including verbal and active participation of all team members addressing the following:Yes  * Correct patient identify  * Confirmed that the correct side and site are marked  * An accurate procedure consent form  * Agreement on the procedure to be done  * Correct patient position  * Relevant images and results are properly labeled and appropriately displayed  * The need to administer antibiotics or fluids for irrigation purposes during the procedure as applicable   * Safety precautions based on patient history or medication use    DURING PROCEDURE: Verification of correct person, site, and procedures any time the responsibility for care of the patient is transferred to another member of the care team.     The following medication was given:   MEDICATION:  Kenalog 40 mg  ROUTE: intra articular  SITE: Left  shoulder  DOSE: 1ml  LOT #: [id number]  : [employer]  EXPIRATION DATE: 10/18  NDC#: 1700-3157-96   Was there drug waste? No    MEDICATION:  Lidocaine without epinephrine  ROUTE: intra articular  SITE: left shoulder  DOSE: 2ml  LOT #: [id number]  : [employer]  EXPIRATION DATE:  NDC#: 69001-257-73   Was there drug waste? Yes  Amount of drug waste (mL): 18.  Reason for waste:  Single use vial      Daryn Martinez, ATC  July 6, 2017

## 2017-07-10 RX ORDER — LIDOCAINE HYDROCHLORIDE 10 MG/ML
2 INJECTION, SOLUTION INFILTRATION; PERINEURAL ONCE
Status: ACTIVE | OUTPATIENT
Start: 2017-07-10

## 2017-07-10 RX ORDER — TRIAMCINOLONE ACETONIDE 40 MG/ML
40 INJECTION, SUSPENSION INTRA-ARTICULAR; INTRAMUSCULAR ONCE
Status: ACTIVE | OUTPATIENT
Start: 2017-07-10

## 2017-07-15 ENCOUNTER — HEALTH MAINTENANCE LETTER (OUTPATIENT)
Age: 52
End: 2017-07-15

## 2017-07-28 DIAGNOSIS — Z96.651 STATUS POST TOTAL RIGHT KNEE REPLACEMENT: Primary | ICD-10-CM

## 2017-08-07 ENCOUNTER — TELEPHONE (OUTPATIENT)
Dept: ORTHOPEDICS | Facility: CLINIC | Age: 52
End: 2017-08-07

## 2017-08-09 NOTE — OP NOTE
DATE OF SURGERY:  2017      PREOPERATIVE DIAGNOSIS:  Prominent right tibial screws.      POSTOPERATIVE DIAGNOSIS:  Prominent right tibial screws.      PROCEDURE PERFORMED:  Removal of 3 tibial screws with washers.      SURGEON:  Karmen Hardy MD      FIRST ASSISTANT:  None.      INDICATION FOR SURGERY:  Roberto Bishop has had persistent pain following difficult problems with the patellofemoral joint and patellar tendon reconstruction.  She now has prominent tibial screws threatening to undermine the anterior leg skin.      PROCEDURE:  Under an outpatient setting with sterile preparation after pause for the cause, Marcaine with epinephrine was infiltrated into 3 sites over the anterior right tibia.  A small incision was made over each screw, the screw head was identified and the screw extracted along with the associated washer.  Each of the 3 screws and washers were removed without difficulty.  Irrigation was performed.  Marcaine with epinephrine infiltrated and a single suture placed in each site.  A compressive bandage was applied.  The patient returned to the recovery area in good condition.         KARMEN HARDY MD             D: 2017 11:30   T: 2017 16:22   MT: PEREZ      Name:     ROBERTO BISHOP   MRN:      [id number]        Account:        [id number]   :      1965           Procedure Date: 2017      Document: [id number]

## 2017-08-17 ENCOUNTER — OFFICE VISIT (OUTPATIENT)
Dept: ORTHOPEDICS | Facility: CLINIC | Age: 52
End: 2017-08-17

## 2017-08-17 DIAGNOSIS — G89.29 CHRONIC PAIN OF LEFT KNEE: Primary | ICD-10-CM

## 2017-08-17 DIAGNOSIS — M25.562 CHRONIC PAIN OF LEFT KNEE: Primary | ICD-10-CM

## 2017-08-17 NOTE — PROGRESS NOTES
"tricia is seen in follow up after screw removal to her right TKA. She reports no new concerns and is feeling \"great\". Denies fevers or chills. \"I am doing whatever I want\". Taking nothing for pain. No night pain noted. Incisions are healed/intact. + CMS No acute neuro deficits.  Xrays show good healing without change in position. TKA components are ideal.    Dx: TKA s/p screw removal    Plan: continue to progress with strengthening and edema control  F/u prn  "

## 2017-08-17 NOTE — LETTER
"8/17/2017       RE: Nicole Rosales  [address]   [facility] [address]     Dear Colleague,    Thank you for referring your patient, Nicole Rosales, to the [facility] at [facility]. Please see a copy of my visit note below.    tricia is seen in follow up after screw removal to her right TKA. She reports no new concerns and is feeling \"great\". Denies fevers or chills. \"I am doing whatever I want\". Taking nothing for pain. No night pain noted. Incisions are healed/intact. + CMS No acute neuro deficits.  Xrays show good healing without change in position. TKA components are ideal.    Dx: TKA s/p screw removal    Plan: continue to progress with strengthening and edema control  F/u prn    Again, thank you for allowing me to participate in the care of your patient.      Sincerely,    Ozzie Angulo MD      "

## 2017-08-17 NOTE — MR AVS SNAPSHOT
After Visit Summary   8/17/2017    Nicole Rosales    MRN: [id number]           Patient Information     Date Of Birth          1965        Visit Information        Provider Department      8/17/2017 1:15 PM Ozzie Angulo MD [facility]        Today's Diagnoses     Chronic pain of left knee    -  1       Follow-ups after your visit        Your next 10 appointments already scheduled     Sep 12, 2017 12:30 PM CDT   NEW NEURO with Abhijit Ochoa MD   Eye Clinic ([facility])    [address]  [address]  [address]  [facility] [address]   [phone]              Who to contact     Please call your clinic at [phone] to:    Ask questions about your health    Make or cancel appointments    Discuss your medicines    Learn about your test results    Speak to your doctor   If you have compliments or concerns about an experience at your clinic, or if you wish to file a complaint, please contact [facility] Patient Relations at [phone] or email us at [email or web address]         Additional Information About Your Visit        MyChart Information     [employer] gives you secure access to your electronic health record. If you see a primary care provider, you can also send messages to your care team and make appointments. If you have questions, please call your primary care clinic.  If you do not have a primary care provider, please call [phone] and they will assist you.      [employer] is an electronic gateway that provides easy, online access to your medical records. With [employer], you can request a clinic appointment, read your test results, renew a prescription or communicate with your care team.     To access your existing account, please contact your [facility] or call [phone] for assistance.        Care EveryWhere ID     This is your Care EveryWhere ID. This could be  used by other organizations to access your [place] medical records  [id number]         Blood Pressure from Last 3 Encounters:   06/22/17 159/69   11/17/16 160/66   01/15/16 118/53    Weight from Last 3 Encounters:   06/22/17 49.9 kg (110 lb)   11/17/16 51.4 kg (113 lb 5.1 oz)   01/27/16 54.4 kg (120 lb)                 Today's Medication Changes          These changes are accurate as of: 8/17/17 11:59 PM.  If you have any questions, ask your nurse or doctor.               Stop taking these medicines if you haven't already. Please contact your care team if you have questions.     HYDROcodone-acetaminophen 5-325 MG per tablet   Commonly known as:  NORCO   Stopped by:  Ozzie Angulo MD                    Primary Care Provider Office Phone # Fax #    Gene C Nelson [phone]       [address] [address]        Equal Access to Services     [facility] : Hadii aad ku hadasho Soomaali, waaxda luqadaha, qaybta kaalmada adeegyada, waxay idiin hayreiniern jen amezcua . So [facility] [phone].    ATENCIÓN: Si habla español, tiene a nicolas disposición servicios gratuitos de asistencia lingüística. Ll[facility] [phone].    We comply with applicable federal civil rights laws and [place] laws. We do not discriminate on the basis of race, color, national origin, age, disability sex, sexual orientation or gender identity.            Thank you!     Thank you for choosing [facility]  for your care. Our goal is always to provide you with excellent care. Hearing back from our patients is one way we can continue to improve our services. Please take a few minutes to complete the written survey that you may receive in the mail after your visit with us. Thank you!             Your Updated Medication List - Protect others around you: Learn how to safely use, store and throw away your medicines at www.disposemymeds.org.          This list is accurate as of:  8/17/17 11:59 PM.  Always use your most recent med list.                   Brand Name Dispense Instructions for use Diagnosis    ADVAIR DISKUS IN      Inhale 1 puff into the lungs two times daily        albuterol 108 (90 BASE) MCG/ACT Inhaler    PROAIR HFA/PROVENTIL HFA/VENTOLIN HFA     Inhale 2 puffs into the lungs every 6 hours as needed        EPINEPHrine 0.3 MG/0.3ML injection 2-pack    EPIPEN/ADRENACLICK/or ANY BX GENERIC EQUIV     Inject 0.3 mg into the muscle once as needed for anaphylaxis        NORVASC PO      Take 5 mg by mouth daily        PROTONIX PO      Take 40 mg by mouth every morning (before breakfast)        REQUIP PO      Take 0.5 mg by mouth daily        tiotropium 18 MCG capsule    SPIRIVA     Inhale 18 mcg into the lungs daily        TRAZODONE HCL PO      Take 50 mg by mouth At Bedtime

## 2017-08-30 DIAGNOSIS — H53.10 SUBJECTIVE VISUAL DISTURBANCE: Primary | ICD-10-CM

## 2019-11-03 ENCOUNTER — HEALTH MAINTENANCE LETTER (OUTPATIENT)
Age: 54
End: 2019-11-03

## 2020-02-10 ENCOUNTER — HEALTH MAINTENANCE LETTER (OUTPATIENT)
Age: 55
End: 2020-02-10

## 2020-03-13 LAB — MAMMOGRAM: NORMAL

## 2020-06-18 DIAGNOSIS — G89.29 CHRONIC PAIN OF RIGHT KNEE: Primary | ICD-10-CM

## 2020-06-18 DIAGNOSIS — M25.561 CHRONIC PAIN OF RIGHT KNEE: Primary | ICD-10-CM

## 2020-06-22 ENCOUNTER — OFFICE VISIT (OUTPATIENT)
Dept: ORTHOPEDICS | Facility: CLINIC | Age: 55
End: 2020-06-22
Payer: MEDICARE

## 2020-06-22 ENCOUNTER — ANCILLARY PROCEDURE (OUTPATIENT)
Dept: GENERAL RADIOLOGY | Facility: CLINIC | Age: 55
End: 2020-06-22
Attending: ORTHOPAEDIC SURGERY
Payer: MEDICARE

## 2020-06-22 VITALS — WEIGHT: 112 LBS | BODY MASS INDEX: 18 KG/M2 | HEIGHT: 66 IN

## 2020-06-22 DIAGNOSIS — M25.561 CHRONIC PAIN OF RIGHT KNEE: ICD-10-CM

## 2020-06-22 DIAGNOSIS — S82.001A CLOSED NONDISPLACED FRACTURE OF RIGHT PATELLA, UNSPECIFIED FRACTURE MORPHOLOGY, INITIAL ENCOUNTER: ICD-10-CM

## 2020-06-22 DIAGNOSIS — G89.29 CHRONIC PAIN OF RIGHT KNEE: ICD-10-CM

## 2020-06-22 DIAGNOSIS — Z96.651 HISTORY OF TOTAL KNEE ARTHROPLASTY, RIGHT: Primary | ICD-10-CM

## 2020-06-22 RX ORDER — LISINOPRIL 20 MG/1
20 TABLET ORAL
COMMUNITY
Start: 2020-03-03

## 2020-06-22 ASSESSMENT — MIFFLIN-ST. JEOR: SCORE: 1106.84

## 2020-06-22 NOTE — PROGRESS NOTES
Service Date: 06/22/2020      CHIEF COMPLAINT:  Right anterior knee pain.      HISTORY OF PRESENT ILLNESS:  Nicole was doing well until arising from a chair a month ago and noted sudden onset of pain and a popping sound in the anterior aspect of her patella.  She has been seen by Dr. Alec Weaver who has diagnosed a patellar fracture.      PAST MEDICAL HISTORY:  Nicole has a complex history involving her right knee.  She had a lateral retinacular release by Dr. Mike Gil for patellofemoral pain many years ago.  She then had a tibial tubercle osteotomy by Dr. Bhatia, which became infected.  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  I attempted a revision of the tubercle osteotomy, but the fragments of bone failed to heal and the hardware was removed.  She ultimately underwent a total knee arthroplasty after a free flap was applied to the area.  She has had no problems with the knee in the 3-year interval until a month ago.      PHYSICAL EXAMINATION:  She is alert and oriented x3.  Her affect is normal.  Gait pattern is normal.  Range of motion is 0-140 degrees.  There is no swelling.  Tenderness is localized directly over the superior pole of the patella.  No crepitus is noted.  She has good motor strength of her extensor mechanism.  The wound is benign and the area does not appear to show signs of infection.  Skin is otherwise unremarkable.      IMAGING:  AP, lateral and full-length x-ray as well as sunrise view show normal alignment and stability of her total knee arthroplasty.  The lateral view demonstrates a superior pole of the patella fracture measuring 8-10 mm in size.  There does appear to be some comminution on the ventral surface of the patella and it does not appear to involve the polyethylene of the patella.  There is a lucency distally in the patella that suggests there may be more comminution.         ASSESSMENT:  Minimally displaced small avulsion fracture, right patella, 3 years status post right total knee replacement in a patient with a many-year history of patellofemoral pain and tubercle osteotomy nonunion with infection in the past.      TREATMENT:  We will get a CT scan to better clarify the condition of the entire patella.     Total time spent was 30 minutes with greater than 50% spent in face to face consultation and collaboration of care.     cc:   Gallo Weaver MD   [facility] [place]  [address]         KARMEN HARDY MD             D: 2020   T: 2020   MT: amy      Name:     ROBERTO BISHOP   MRN:      [id number]        Account:      [id number]   :      1965           Service Date: 2020      Document: [id number]

## 2020-06-22 NOTE — NURSING NOTE
"Reason For Visit:   Chief Complaint   Patient presents with     RECHECK     Right knee pain About a month ago patient reports standing from a seated postion and hearing a pop        Ht 1.664 m (5' 5.5\")   Wt 50.8 kg (112 lb)   BMI 18.35 kg/m      Pain Assessment  Patient Currently in Pain: Yes  0-10 Pain Scale: 6  Primary Pain Location: Knee    Inez Chacko ATC    "

## 2020-06-22 NOTE — LETTER
6/22/2020     RE: Nicole Rosales  [address]  [facility] [address]      Dear Colleague,    Thank you for referring your patient, Nicole Rosales, to the [facility]. Please see a copy of my visit note below.    Service Date: 06/22/2020      CHIEF COMPLAINT:  Right anterior knee pain.      HISTORY OF PRESENT ILLNESS:  Nicole was doing well until arising from a chair a month ago and noted sudden onset of pain and a popping sound in the anterior aspect of her patella.  She has been seen by Dr. Alec Weaver who has diagnosed a patellar fracture.      PAST MEDICAL HISTORY:  Nicole has a complex history involving her right knee.  She had a lateral retinacular release by Dr. Mike Gil for patellofemoral pain many years ago.  She then had a tibial tubercle osteotomy by Dr. Bhatia, which became infected.  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  I attempted a revision of the tubercle osteotomy, but the fragments of bone failed to heal and the hardware was removed.  She ultimately underwent a total knee arthroplasty after a free flap was applied to the area.  She has had no problems with the knee in the 3-year interval until a month ago.      PHYSICAL EXAMINATION:  She is alert and oriented x3.  Her affect is normal.  Gait pattern is normal.  Range of motion is 0-140 degrees.  There is no swelling.  Tenderness is localized directly over the superior pole of the patella.  No crepitus is noted.  She has good motor strength of her extensor mechanism.  The wound is benign and the area does not appear to show signs of infection.  Skin is otherwise unremarkable.      IMAGING:  AP, lateral and full-length x-ray as well as sunrise view show normal alignment and stability of her total knee arthroplasty.  The lateral view demonstrates a superior pole of the patella fracture measuring 8-10 mm in size.  There does appear  to be some comminution on the ventral surface of the patella and it does not appear to involve the polyethylene of the patella.  There is a lucency distally in the patella that suggests there may be more comminution.        ASSESSMENT:  Minimally displaced small avulsion fracture, right patella, 3 years status post right total knee replacement in a patient with a many-year history of patellofemoral pain and tubercle osteotomy nonunion with infection in the past.      TREATMENT:  We will get a CT scan to better clarify the condition of the entire patella.     Total time spent was 30 minutes with greater than 50% spent in face to face consultation and collaboration of care.       PLAN:   1.  We will obtain a CT scan to better evaluate the entire patella.   2.  She has been cautioned to avoid deep knee flexion.   3.  We provided her with an elastic knee sleeve for comfort.   4.  I doubt that surgical intervention other than excision of fragment will be necessary unless symptoms persist.      KARMEN HARDY MD       cc:   Gallo Weaver MD   [facility]    [place]  [address]      D: 2020   T: 2020   MT: amy    Name:     ROBERTO BISHOP   MRN:      [id number]        Account:      [id number]   :      1965           Service Date: 2020    Document: [id number]

## 2020-06-23 ENCOUNTER — TELEPHONE (OUTPATIENT)
Dept: ORTHOPEDICS | Facility: CLINIC | Age: 55
End: 2020-06-23

## 2020-06-23 NOTE — PROGRESS NOTES
Service Date: 2020      PLAN:   1.  We will obtain a CT scan to better evaluate the entire patella.   2.  She has been cautioned to avoid deep knee flexion.   3.  We provided her with an elastic knee sleeve for comfort.   4.  I doubt that surgical intervention other than excision of fragment will be necessary unless symptoms persist.      cc:   Gallo Weaver MD   [facility]    [facility]  [address]         KARMEN HARDY MD             D: 2020   T: 2020   MT: amy      Name:     ROBERTO BISHOP   MRN:      [id number]        Account:      [id number]   :      1965           Service Date: 2020      Document: [id number]

## 2020-06-23 NOTE — TELEPHONE ENCOUNTER
[facility] Call Center    Phone Message    May a detailed message be left on voicemail: yes     Reason for Call: Other: Pt would like a call back to discuss pain meedication as tylenal is not working and pain is getting worse and knee is really swollen. pLease reach out to pt to discuss     Action Taken: Message routed to:  [facility]    Travel Screening: Not Applicable

## 2020-06-25 NOTE — TELEPHONE ENCOUNTER
Nicole was phoned back and voicemail was left that Dr Angulo does not prescribe pain medications for chronic knee pain, but her PCP or pain clinic would be the best source for chronic pain.    We will be watching for CT results to see if she may be a surgical candidate for her knee pain.  Pt was asked to call back if she is interested in pursuing surgery as the next step.  Amara Hayden RN

## 2020-06-26 ENCOUNTER — TELEPHONE (OUTPATIENT)
Dept: ORTHOPEDICS | Facility: CLINIC | Age: 55
End: 2020-06-26

## 2020-06-26 NOTE — TELEPHONE ENCOUNTER
[facility] Call [facility]    Phone Message    May a detailed message be left on voicemail: yes     Reason for Call: Other: pt calling because she is having alot of pain in her knee to the point she is in tears. The pt has a CT set up , but the pt states she can no longer handle the pain. Please call the pt back to discuss. Thank You.       Action Taken: Message routed to:  [facility]o    Travel Screening: Not Applicable

## 2020-07-03 ENCOUNTER — ANCILLARY PROCEDURE (OUTPATIENT)
Dept: CT IMAGING | Facility: CLINIC | Age: 55
End: 2020-07-03
Attending: ORTHOPAEDIC SURGERY
Payer: MEDICARE

## 2020-07-06 ENCOUNTER — TELEPHONE (OUTPATIENT)
Dept: ORTHOPEDICS | Facility: CLINIC | Age: 55
End: 2020-07-06

## 2020-07-06 NOTE — TELEPHONE ENCOUNTER
[facility]    Phone Message    May a detailed message be left on voicemail: yes     Reason for Call: Other: Pt. called indicating her right knee is worsening, she is experiencing difficulty walking and sleeping.  Per. Pt., Pt. had CT scan the morning of 07/03/2020.  Pt. wants to schedule surgery and would like a call back to discuss schedling surgery.  Pt. indicates she has previosly spoken with Amara.  Please call back on cell at [phone]      Action Taken: Message routed to:  [facility] ortho    Travel Screening: Not Applicable

## 2020-07-06 NOTE — TELEPHONE ENCOUNTER
Nicole was phoned back by RN and voicemail was left that Dr Angulo will review the CT and determine if surgery is the next step.  Amara Hayden RN

## 2020-07-07 NOTE — TELEPHONE ENCOUNTER
[facility] Call Center    Phone Message    May a detailed message be left on voicemail: yes     Reason for Call: Other:   Pt following up about message that was sent yesterday morning. Pt wants a call back to discuss surgery and that pt completed her ct scan. Pt states that her knee has been giving her so much pain, pt hasn't slept for 7 wk.     Please call pt back asap to advise.     Action Taken: Other:  ortho    Travel Screening: Not Applicable

## 2020-07-09 NOTE — TELEPHONE ENCOUNTER
Nicole was called back by RN.  We reviewed the plan to hold a surgery date for Nicole, and we will have Dr Angulo write up his surgery plan if indicated.  Nicole stated the knee pain causes so much pain she is not able to sleep.  She is using a knee brace, icing, OTC analgesics without relief.  Amara Hayden RN

## 2020-07-09 NOTE — TELEPHONE ENCOUNTER
[facility]    Phone Message    May a detailed message be left on voicemail: yes     Reason for Call: Other: Pt called and is frustrated that pt still has not received any call back. Writer informed pt that Amara did left a VM and pt stated pt is having phone problems. Please call back pt asap. Thanks.,     Action Taken: Message routed to:  [facility] OR[facility]O    Travel Screening: Not Applicable

## 2020-07-13 ENCOUNTER — TELEPHONE (OUTPATIENT)
Dept: ORTHOPEDICS | Facility: CLINIC | Age: 55
End: 2020-07-13

## 2020-07-13 DIAGNOSIS — M25.561 CHRONIC PAIN OF RIGHT KNEE: Primary | ICD-10-CM

## 2020-07-13 DIAGNOSIS — Z96.651 STATUS POST TOTAL RIGHT KNEE REPLACEMENT: ICD-10-CM

## 2020-07-13 DIAGNOSIS — G89.29 CHRONIC PAIN OF RIGHT KNEE: Primary | ICD-10-CM

## 2020-07-13 NOTE — TELEPHONE ENCOUNTER
[facility]    Phone Message    May a detailed message be left on voicemail: yes     Reason for Call: Requesting Results   Name/type of test: CT scan, requesting call back from Dr. Angulo only. Pt expressed frustration that she has not heard back yet. Pt stated she has been going on weeks of knee pain and not being able to sleep  Date of test: 7/3/20  Was test done at a location other than [facility] (Please fill in the location if not [facility])?: No      Action Taken: Message routed to:  [facility]

## 2020-07-14 RX ORDER — CYCLOBENZAPRINE HCL 10 MG
10 TABLET ORAL
Qty: 30 TABLET | Refills: 0 | Status: SHIPPED | OUTPATIENT
Start: 2020-07-14

## 2020-07-14 NOTE — TELEPHONE ENCOUNTER
Dr Angulo has reviewed pt's recent images, states she is not a surgical candidate for her knee pain, she should immobilize the knee.  Pt was called, she has braces at home which she can use to immobilize and it was stressed that for 4 weeks she needs to keep it rested and immobilized.  Pt stated she has been not getting sleep due to her knee pain, asking for refill of flexeril for bedtime, which she states has helped in the past.  Additional refills should come from pt's PCP.  Rx was sent to pt's pharmacy.  Amara Hayden RN

## 2020-07-14 NOTE — TELEPHONE ENCOUNTER
[facility] Call [facility]    Phone Message    May a detailed message be left on voicemail: yes     Reason for Call: Other:     Nicole is calling in again asking to get her CT results and to discuss next steps.  Please call her back.       Action Taken: Message routed to:  [facility] Ortho    Travel Screening: Not Applicable                                                                    ;

## 2020-07-16 ENCOUNTER — TELEPHONE (OUTPATIENT)
Dept: ORTHOPEDICS | Facility: CLINIC | Age: 55
End: 2020-07-16

## 2020-07-16 NOTE — TELEPHONE ENCOUNTER
[facility]    Phone Message    May a detailed message be left on voicemail: yes     Reason for Call: Other: Pt is calling for Amara to let her know that all of her braces are way too big because of her weight loss. Pt needs something that will work. She wants a call back tomorrow     Action Taken: Message routed to:  [facility] Ortho    Travel Screening: Not Applicable

## 2020-07-17 NOTE — TELEPHONE ENCOUNTER
Pt was phoned back by RN.  We reviewed the plan for Nicole to keep her knee immobilized for four weeks.  Pt states none of her knee immobilizers work.  She will come in on Monday for a nurse visit and we will see what can be adjusted or get a new immobilizer or knee brace locked from our DME closet.  Amara Hayden RN

## 2020-07-28 ENCOUNTER — TELEPHONE (OUTPATIENT)
Dept: ORTHOPEDICS | Facility: CLINIC | Age: 55
End: 2020-07-28

## 2020-07-28 NOTE — TELEPHONE ENCOUNTER
Per Amara, ATC scheduled to see Aliya on 7/30 to recheck on her right knee and brace.            -YASMIN Cook- Orthopedics

## 2020-07-28 NOTE — TELEPHONE ENCOUNTER
[facility]    Phone Message    May a detailed message be left on voicemail: yes     Reason for Call: Other: the patient is calling about her right knee, she's been wearing the brace and followed all the direction from the doctor but it's not getting better please review and follow up with patient to address concerns about the pain she's really looking for a solution for the pain thank you.      Action Taken: Message routed to:  [facility]    Travel Screening: Not Applicable

## 2020-07-30 ENCOUNTER — DOCUMENTATION ONLY (OUTPATIENT)
Dept: CARE COORDINATION | Facility: CLINIC | Age: 55
End: 2020-07-30

## 2020-08-27 ENCOUNTER — TELEPHONE (OUTPATIENT)
Dept: ORTHOPEDICS | Facility: CLINIC | Age: 55
End: 2020-08-27

## 2020-08-27 NOTE — TELEPHONE ENCOUNTER
Health Call Center    Phone Message    May a detailed message be left on voicemail: yes     Reason for Call: Symptoms or Concerns     If patient has red-flag symptoms, warm transfer to triage line    Current symptom or concern: Pt has been wearing a knee brace and now whole leg is getting numb. Almost fell when walking. Also questions on getting surgery.    Symptoms have been present for:unknown    Has patient previously been seen for this? No    By: Dr. Angulo    Date: Today    Are there any new or worsening symptoms? Yes: numbness      Action Taken: Message routed to:  [facility] ([facility]): [facility] ORTHO    Travel Screening: Not Applicable

## 2020-09-16 ENCOUNTER — TELEPHONE (OUTPATIENT)
Dept: ORTHOPEDICS | Facility: CLINIC | Age: 55
End: 2020-09-16

## 2020-09-16 NOTE — TELEPHONE ENCOUNTER
[facility] Call [facility]    Phone Message    May a detailed message be left on voicemail: yes     Reason for Call: Other: Patient said she's been trying to get someone to call her back about the order for her knee, she said it's been 3 weeks and she hasn't heard anything.  Action Taken: Message routed to:  [facility] ([facility]): Orthopedic    Travel Screening: Not Applicable                                                                       Health Call Center    Phone Message    May a detailed message be left on voicemail: yes     Reason for Call: Order(s): Other:   Reason for requested: Pain Clinic   Date needed: today  Provider name: Ozzie Angulo       Action Taken: Message routed to:  [facility] ([facility]): Ozzei Angulo     Travel Screening: Not Applicable           Patients calling to request an order for her (R) Knee to be seen in the Pain Clinic she is in a lot of pain and can barely walk. For her insurance to cover it she needs a referral signed by Ozzie Angulo.    Please reach out to patient if/when the order/referal is processed.     Patient called back really upset she has not been called back and is saying she wants a lawsuit against him and his care team due to not getting back to her on multiple occasions on her request for pain clinic orders and doing a surgery she never wanted. She wants a call back tomorrow in the morning from him or his care team or she said she is contacting an

## 2020-09-17 ENCOUNTER — TELEPHONE (OUTPATIENT)
Dept: ORTHOPEDICS | Facility: CLINIC | Age: 55
End: 2020-09-17

## 2020-09-17 DIAGNOSIS — G89.29 CHRONIC KNEE PAIN AFTER TOTAL REPLACEMENT OF RIGHT KNEE JOINT: Primary | ICD-10-CM

## 2020-09-17 DIAGNOSIS — M25.561 CHRONIC KNEE PAIN AFTER TOTAL REPLACEMENT OF RIGHT KNEE JOINT: Primary | ICD-10-CM

## 2020-09-17 DIAGNOSIS — Z96.651 CHRONIC KNEE PAIN AFTER TOTAL REPLACEMENT OF RIGHT KNEE JOINT: Primary | ICD-10-CM

## 2020-09-17 NOTE — TELEPHONE ENCOUNTER
I called the patient back this morning after hearing back from Dr. Angulo. I was sent directly to [facility].    [facility] letting her know the order has been placed but we would like to follow up with her in clinic because we have not seen her since June. She should schedule with Dr. Angulo on Monday 10/5/20 at either 0915 [id number] or 1100. Please schedule patient upon her return call. If one of these times is taken please do not over book.    Inez Chacko, ATC

## 2020-09-17 NOTE — TELEPHONE ENCOUNTER
[facility] Call [facility]    Phone Message    May a detailed message be left on voicemail: yes     Reason for Call: Other: Patient would like to know what location she is being referred to for the pain clinic?     Action Taken: Message routed to:  [facility] ([facility]): ortho    Travel Screening: Not Applicable

## 2020-09-17 NOTE — TELEPHONE ENCOUNTER
Called and left voicemail for patient. Informed her that she can go to a pain clinic of her choosing or she can come to the clinics and surgery center.     Information given for the [facility].     [facility]  [facility] [place]  [address]  [address] [place] [address]  Appointments: [phone]

## 2020-09-17 NOTE — TELEPHONE ENCOUNTER
Voicemail messages have been left responding to Nicole's phone requests and concerns. A pain clinic order was placed and recommended we see her again in clinic to address her concerns and questions regarding her knee surgery.

## 2020-10-05 ENCOUNTER — ANCILLARY PROCEDURE (OUTPATIENT)
Dept: GENERAL RADIOLOGY | Facility: CLINIC | Age: 55
End: 2020-10-05
Attending: ORTHOPAEDIC SURGERY
Payer: MEDICARE

## 2020-10-05 ENCOUNTER — OFFICE VISIT (OUTPATIENT)
Dept: ORTHOPEDICS | Facility: CLINIC | Age: 55
End: 2020-10-05
Payer: MEDICARE

## 2020-10-05 DIAGNOSIS — M25.561 CHRONIC KNEE PAIN AFTER TOTAL REPLACEMENT OF RIGHT KNEE JOINT: ICD-10-CM

## 2020-10-05 DIAGNOSIS — G89.29 CHRONIC KNEE PAIN AFTER TOTAL REPLACEMENT OF RIGHT KNEE JOINT: ICD-10-CM

## 2020-10-05 DIAGNOSIS — S82.044G: Primary | ICD-10-CM

## 2020-10-05 DIAGNOSIS — Z96.651 CHRONIC KNEE PAIN AFTER TOTAL REPLACEMENT OF RIGHT KNEE JOINT: ICD-10-CM

## 2020-10-05 PROCEDURE — 73560 X-RAY EXAM OF KNEE 1 OR 2: CPT | Mod: RT | Performed by: RADIOLOGY

## 2020-10-05 PROCEDURE — 99213 OFFICE O/P EST LOW 20 MIN: CPT | Performed by: ORTHOPAEDIC SURGERY

## 2020-10-05 RX ORDER — MELOXICAM 15 MG/1
15 TABLET ORAL DAILY
Qty: 30 TABLET | Refills: 0 | Status: SHIPPED | OUTPATIENT
Start: 2020-10-05

## 2020-10-05 NOTE — PROGRESS NOTES
Service Date: 10/05/2020      FOLLOWUP CLINIC NOTE      Roberto reports persisting nighttime pain.  She says this is exacerbating her restless leg syndrome.  She takes medications for the restless leg syndrome but without benefit.  She takes ibuprofen for the discomfort but of no benefit.  She notes aching discomfort with stairs ascent and descent.      PHYSICAL EXAMINATION:  She is alert and oriented x3.  She has a normal affect.  She describes her pain as a peripatellar with flexed knee activities.  I can feel a prominence of the superior pole of her patella to a slight extent, but this is nonmobile.  She reports pain on longitudinal traction and compression of her patella but not on anterior to posterior compression.  There is no effusion.  There is no localized swelling.  There are no signs of infection.  The crepitus noted is mild to moderate.  Knee motion is from 0 to 140 degrees.  Extensor mechanism functions well.  Wounds are benign.  The skin is otherwise normal.      IMAGING:  AP, lateral x-rays of the knee and sunrise view show the same position of the superior pole patellar fracture.  The CT scan that was obtained in July shows a high degree of comminution without significant displacement and no loosening of the patellar component.      ASSESSMENT:  Persisting extensor mechanism pain associated with a comminuted patellar fracture in the face of a prior total knee arthroplasty.      TREATMENT:  I believe she has mechanical symptoms from the fracture without evidence of progression of separation.  We will have her use a TENS unit on a regular basis and switch from ibuprofen to meloxicam.  If she is no better, we will consider a new CT scan.         KARMEN HARDY MD             D: 10/05/2020   T: 10/05/2020   MT: [email or web address]      Name:     ROBERTO BISHOP   MRN:      [id number]        Account:      [id number]   :      1965           Service Date: 10/05/2020      Document: [id number]

## 2020-10-05 NOTE — NURSING NOTE
Reason For Visit:   Chief Complaint   Patient presents with     RECHECK     continued right knee pain        There were no vitals taken for this visit.    Pain Assessment  Patient Currently in Pain: Yes  0-10 Pain Scale: 7  Primary Pain Location: Knee    Inez Chacko ATC     [email or web address]

## 2020-10-16 ENCOUNTER — TELEPHONE (OUTPATIENT)
Dept: ORTHOPEDICS | Facility: CLINIC | Age: 55
End: 2020-10-16

## 2020-10-16 NOTE — TELEPHONE ENCOUNTER
Nicole was called back by RN and voicemail was left that Dr Angulo's team was given update.  Pt was advised to use knee immobilizer, ice, elevate, and give us an update on Monday.  Amara Hayden RN

## 2020-10-19 NOTE — TELEPHONE ENCOUNTER
Nicole was called by RN and voicemail left with direct dial number for Nicole to call back with update about her right knee which last week gave out and caused pain.  She was to immobilize, rest, elevate, ice over the weekend and call today with update.  Amara Hayden RN    10:30AM  Nicole called back and stated she has been using knee brace to immobilize and crutches but states when up to bathroom at night her knee gives out, and the pain is worsening, not getting better.  She has CT scheduled with appt to see Dr Angulo on 11/2/20.  She will continue with brace immobilizer for stability until her appt.  Amara Hayden RN

## 2020-11-02 ENCOUNTER — ANCILLARY PROCEDURE (OUTPATIENT)
Dept: CT IMAGING | Facility: CLINIC | Age: 55
End: 2020-11-02
Attending: NURSE PRACTITIONER
Payer: MEDICARE

## 2020-11-02 ENCOUNTER — OFFICE VISIT (OUTPATIENT)
Dept: ORTHOPEDICS | Facility: CLINIC | Age: 55
End: 2020-11-02
Payer: MEDICARE

## 2020-11-02 DIAGNOSIS — S82.044G: ICD-10-CM

## 2020-11-02 DIAGNOSIS — S82.044G: Primary | ICD-10-CM

## 2020-11-02 PROCEDURE — 73700 CT LOWER EXTREMITY W/O DYE: CPT | Mod: RT | Performed by: RADIOLOGY

## 2020-11-02 PROCEDURE — 99213 OFFICE O/P EST LOW 20 MIN: CPT | Performed by: ORTHOPAEDIC SURGERY

## 2020-11-02 NOTE — NURSING NOTE
Reason For Visit:   Chief Complaint   Patient presents with     RECHECK     follow up right knee        There were no vitals taken for this visit.    Pain Assessment  Patient Currently in Pain: Yes  0-10 Pain Scale: 8  Primary Pain Location: Knee    Inez Chacko ATC

## 2020-11-02 NOTE — LETTER
"    11/2/2020         RE: Nicole Rosales  [address] [facility] [address]        Dear Colleague,    Thank you for referring your patient, Nicole Rosales, to the [facility] [place]. Please see a copy of my visit note below.    CC: \"continued right knee pain:    HPI:  Nicole continues to call with complaint of knee pain that \"just isn't getting any better\". She reports that the pain at night is \"the worst\". She denies mechanical symptoms to her knee. She localizes the pain as around her \"kneecap\". She denies swelling to her knee but at times \"stairs make it worse'. She has tried many types of compression socks and braces. She takes tylenol or advil \"but doesn't do anything\".    PMH: Reviewed from patient chart today 11/2/2020    ROS: Reviewed from patient tablet today 11/2/2020 with all systems negative except for those listed below.    PE:  Pleasant and cooperative female alert and oriented x3. She rises from chair without hesitation. No focal areas of tenderness along the patella or joint spaces. There is no effusion. Extensor mechanism functions well. Wounds are al healed and benign. ROM:0-132. There is crepitus to palpate the patella but it is not painful.     CT scan obtained today and reviewed by Dr. Angulo who agrees with radiologist read as below:    Impression: Compared to CT 7/3/2020, grossly unchanged appearance of  comminuted, displaced patellar fracture without substantial bony  callus formation in the interim.    Dx:  1. Persisting right knee pain with patella fracture s/p TKA with improved mechanical symptoms    Plan:  1. Dr. Angulo is still recommending no surgery. We have suggested a bone stimulator and mobic 15 mg po daily. She will continue to use a brace and quad strengthening.  2. Follow up in 3-6 months.    Total time spent was 20 minutes with greater than 50% spent in face to face consultation and collaboration of care.    I, Dr. Ozzie Angulo agree with above " documentation and plan of care.        Again, thank you for allowing me to participate in the care of your patient.        Sincerely,        Ozzie Angulo MD

## 2020-11-02 NOTE — PROGRESS NOTES
"CC: \"continued right knee pain:    HPI:  Nicole continues to call with complaint of knee pain that \"just isn't getting any better\". She reports that the pain at night is \"the worst\". She denies mechanical symptoms to her knee. She localizes the pain as around her \"kneecap\". She denies swelling to her knee but at times \"stairs make it worse'. She has tried many types of compression socks and braces. She takes tylenol or advil \"but doesn't do anything\".    PMH: Reviewed from patient chart today 11/2/2020    ROS: Reviewed from patient tablet today 11/2/2020 with all systems negative except for those listed below.    PE:  Pleasant and cooperative female alert and oriented x3. She rises from chair without hesitation. No focal areas of tenderness along the patella or joint spaces. There is no effusion. Extensor mechanism functions well. Wounds are al healed and benign. ROM:0-132. There is crepitus to palpate the patella but it is not painful.     CT scan obtained today and reviewed by Dr. Angulo who agrees with radiologist read as below:    Impression: Compared to CT 7/3/2020, grossly unchanged appearance of  comminuted, displaced patellar fracture without substantial bony  callus formation in the interim.    Dx:  1. Persisting right knee pain with patella fracture s/p TKA with improved mechanical symptoms    Plan:  1. Dr. Angulo is still recommending no surgery. We have suggested a bone stimulator and mobic 15 mg po daily. She will continue to use a brace and quad strengthening.  2. Follow up in 3-6 months.    Total time spent was 20 minutes with greater than 50% spent in face to face consultation and collaboration of care.    I, Dr. Ozzie Angulo agree with above documentation and plan of care.    "

## 2021-04-03 ENCOUNTER — HEALTH MAINTENANCE LETTER (OUTPATIENT)
Age: 56
End: 2021-04-03

## 2021-05-24 ENCOUNTER — TELEPHONE (OUTPATIENT)
Dept: ORTHOPEDICS | Facility: CLINIC | Age: 56
End: 2021-05-24

## 2021-05-24 NOTE — TELEPHONE ENCOUNTER
RN called and left patient with a detailed VM. Message are advise for patient to see Dr. Bryant as Dr. Angulo is longer practicing at the [facility] and has moved to [facility]. RN provided call back number so she can schedule.  Jarad Cruz RN        [facility]    Phone Message    May a detailed message be left on voicemail: yes     Reason for Call: Other: Patient states she had right knee surgery with . Patient is calling today to get an appt set up/surgery to be seen for her left knee with him. I did inform patient that  is no longer with us here at the [facility], but patient states she only wants to see  and can only see . She specifically referred to  being able to see her for her bone infection as well, whereas other providers were not able to see her for her knee pain and bone infection. If you could reach back out to patient to get her scheduled with the best suited provider for her.      Action Taken: Message routed to:  [facility] ([facility]): ORTHO    Travel Screening: Not Applicable

## 2021-05-29 ENCOUNTER — RECORDS - HEALTHEAST (OUTPATIENT)
Dept: ADMINISTRATIVE | Facility: CLINIC | Age: 56
End: 2021-05-29

## 2021-09-18 ENCOUNTER — HEALTH MAINTENANCE LETTER (OUTPATIENT)
Age: 56
End: 2021-09-18

## 2022-04-30 ENCOUNTER — HEALTH MAINTENANCE LETTER (OUTPATIENT)
Age: 57
End: 2022-04-30

## 2022-06-25 ENCOUNTER — HEALTH MAINTENANCE LETTER (OUTPATIENT)
Age: 57
End: 2022-06-25

## 2022-11-19 ENCOUNTER — HEALTH MAINTENANCE LETTER (OUTPATIENT)
Age: 57
End: 2022-11-19

## 2023-06-01 ENCOUNTER — HEALTH MAINTENANCE LETTER (OUTPATIENT)
Age: 58
End: 2023-06-01

## 2023-08-25 NOTE — TELEPHONE ENCOUNTER
Health Call Center    Phone Message    May a detailed message be left on voicemail: yes     Reason for Call: Symptoms or Concerns     If patient has red-flag symptoms, warm transfer to triage line    Current symptom or concern: Pt calling stating pt's Rt knee completely gave out today and she ended up falling and her dad had to carry her in the house.  Her next appt with Dr. Angulo is not until 11/02/20    Symptoms have been present for: 3 hour(s)    Has patient previously been seen for this? Yes    By Dr. Angulo    Date:     Are there any new or worsening symptoms? Yes:       Action Taken: Message routed to:  [facility] ([facility]): Ortho    Travel Screening: Not Applicable                                                                       HD cath removed,tip intact. Large amount of blood noted, quick clot applied. Will continue to monitor.

## (undated) DEVICE — LINEN ORTHO PACK 5446

## (undated) DEVICE — GLOVE PROTEXIS BLUE W/NEU-THERA 8.0  2D73EB80

## (undated) DEVICE — PACK LOWER EXTREMITY CUSTOM ASC

## (undated) DEVICE — LINEN TOWEL PACK X5 5464

## (undated) DEVICE — CAST PADDING 4" STERILE 9044S

## (undated) DEVICE — DECANTER VIAL 2006S

## (undated) DEVICE — GOWN XLG DISP 9545

## (undated) DEVICE — PREP DURAPREP 26ML APL 8630

## (undated) DEVICE — CAST PADDING 6" STERILE 9046S

## (undated) DEVICE — ESU GROUND PAD ADULT W/CORD E7507

## (undated) DEVICE — DRSG STERI STRIP 1/2X4" R1547

## (undated) DEVICE — SU VICRYL 2-0 CT-2 27" UND J269H

## (undated) DEVICE — GLOVE PROTEXIS W/NEU-THERA 8.0  2D73TE80

## (undated) DEVICE — SU MONOCRYL 3-0 PS-2 18" UND Y497G

## (undated) DEVICE — BNDG ELASTIC 6"X5YDS STERILE 6611-6S

## (undated) DEVICE — SUCTION MANIFOLD NEPTUNE 2 SYS 1 PORT 702-025-000

## (undated) DEVICE — SU MONOCRYL 3-0 PS-1 27" Y936H

## (undated) DEVICE — LINEN GOWN X4 5410

## (undated) DEVICE — SOL WATER IRRIG 1000ML BOTTLE 2F7114

## (undated) DEVICE — SOL NACL 0.9% IRRIG 1000ML BOTTLE 2F7124

## (undated) DEVICE — GLOVE PROTEXIS BLUE W/NEU-THERA 7.5  2D73EB75

## (undated) DEVICE — PACK OPEN SHOULDER CUSTOM ASC

## (undated) RX ORDER — TRIAMCINOLONE ACETONIDE 40 MG/ML
INJECTION, SUSPENSION INTRA-ARTICULAR; INTRAMUSCULAR
Status: DISPENSED
Start: 2017-07-06

## (undated) RX ORDER — LIDOCAINE HYDROCHLORIDE 10 MG/ML
INJECTION, SOLUTION INFILTRATION; PERINEURAL
Status: DISPENSED
Start: 2017-07-06

## (undated) RX ORDER — BUPIVACAINE HYDROCHLORIDE AND EPINEPHRINE 5; 5 MG/ML; UG/ML
INJECTION, SOLUTION EPIDURAL; INTRACAUDAL; PERINEURAL
Status: DISPENSED
Start: 2017-06-22